# Patient Record
Sex: MALE | Race: OTHER | Employment: FULL TIME | URBAN - METROPOLITAN AREA
[De-identification: names, ages, dates, MRNs, and addresses within clinical notes are randomized per-mention and may not be internally consistent; named-entity substitution may affect disease eponyms.]

---

## 2021-08-29 ENCOUNTER — HOSPITAL ENCOUNTER (EMERGENCY)
Age: 36
Discharge: HOME | End: 2021-08-29

## 2021-08-29 ENCOUNTER — APPOINTMENT (OUTPATIENT)
Dept: CT IMAGING | Age: 36
End: 2021-08-29

## 2021-08-29 VITALS
OXYGEN SATURATION: 98 % | WEIGHT: 191 LBS | HEART RATE: 79 BPM | DIASTOLIC BLOOD PRESSURE: 89 MMHG | TEMPERATURE: 98.4 F | SYSTOLIC BLOOD PRESSURE: 133 MMHG | RESPIRATION RATE: 17 BRPM

## 2021-08-29 DIAGNOSIS — R51.9 NONINTRACTABLE HEADACHE, UNSPECIFIED CHRONICITY PATTERN, UNSPECIFIED HEADACHE TYPE: Primary | ICD-10-CM

## 2021-08-29 PROCEDURE — 99282 EMERGENCY DEPT VISIT SF MDM: CPT

## 2021-08-29 PROCEDURE — 70450 CT HEAD/BRAIN W/O DYE: CPT

## 2021-08-29 RX ORDER — BUTALBITAL, ACETAMINOPHEN AND CAFFEINE 300; 40; 50 MG/1; MG/1; MG/1
1 CAPSULE ORAL EVERY 4 HOURS PRN
Qty: 15 CAPSULE | Refills: 0 | Status: SHIPPED | OUTPATIENT
Start: 2021-08-29 | End: 2021-10-26 | Stop reason: ALTCHOICE

## 2021-08-29 RX ORDER — BUTALBITAL, ACETAMINOPHEN AND CAFFEINE 50; 325; 40 MG/1; MG/1; MG/1
1 TABLET ORAL ONCE
Status: DISCONTINUED | OUTPATIENT
Start: 2021-08-29 | End: 2021-08-30 | Stop reason: HOSPADM

## 2021-08-29 ASSESSMENT — ENCOUNTER SYMPTOMS
DIARRHEA: 0
NAUSEA: 0
VOMITING: 0
CHEST TIGHTNESS: 0
SHORTNESS OF BREATH: 0
ABDOMINAL PAIN: 0

## 2021-08-29 ASSESSMENT — PAIN DESCRIPTION - PAIN TYPE: TYPE: ACUTE PAIN

## 2021-08-29 ASSESSMENT — PAIN SCALES - GENERAL: PAINLEVEL_OUTOF10: 7

## 2021-08-30 ENCOUNTER — CARE COORDINATION (OUTPATIENT)
Dept: CARE COORDINATION | Age: 36
End: 2021-08-30

## 2021-08-30 NOTE — CARE COORDINATION
Patient contacted regarding recent visit for viral symptoms. Call within 2 business days of discharge: Yes     contacted the patient by telephone to perform follow-up call. Contacted pt using [employer].  left HIPAA compliant message on machine requesting a return call. If no return call, will attempt outreach again.
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
## 2021-08-31 NOTE — CARE COORDINATION
Contacted pt using [employer].  ID: [id number] used to interpret call. Pt reports that he is feeling better and has picked up RX and has been taking it. Pt reports that new medication is helping to improve HA. Pt was agreeable for call back next week for symptom recheck. Pt thanked author for calling to follow up. Patient contacted regarding recent visit for viral symptoms. Call within 2 business days of discharge: Yes     contacted the patient by telephone to perform follow-up call. Verified name and  with patient as identifiers. Provided introduction to self, and reason for call due to viral symptoms of infection and/or exposure to COVID-19. Discussed COVID-19 related testing which was not done at this time. Test results were not done. Patient informed of results, if available? No.      Patient presented to emergency department/flu clinic with complaints of viral symptoms/exposure to COVID. Patient reports symptoms are improving. Due to no new or worsening symptoms the RN CTN/ACM was not notified for escalation. This author reviewed discharge instructions, medical action plan and red flags such as increased shortness of breath, increasing fever, worsening cough or chest pain with patient who verbalized understanding. Discussed exposure protocols and quarantine with CDC Guidelines What To Do If You Are Sick    Patient was given an opportunity for questions and concerns. The patient agrees to contact their healthcare provider for questions related to their healthcare. Author provided contact information for future reference.

## 2021-09-08 ENCOUNTER — CARE COORDINATION (OUTPATIENT)
Dept: CARE COORDINATION | Age: 36
End: 2021-09-08

## 2021-09-08 NOTE — CARE COORDINATION
Patient contacted regarding COVID-19 risk and screening. Contacted pt using [employer].  [id number] used to interpret call.  contacted the patient by telephone to perform follow-up call. Verified name and  with patient as identifiers. Symptoms reviewed with patient. Patient reports symptoms are improving. Due to no new or worsening symptoms the RN CTN/LIBBY was not notified for escalation. This author reviewed discharge instructions, medical action plan and red flags such as increased shortness of breath, increasing fever, worsening cough or chest pain with patient who verbalized understanding. Discussed exposure protocols and quarantine with CDC Guidelines What To Do If You Are Sick    Patient who was given an opportunity for questions and concerns. The patient agrees to contact their healthcare provider for questions related to their healthcare. Author provided contact information for future reference. Spoke to pt who reports that he's doing well and is out shopping currently. Pt reports that he is still using the Fioricet prn when he gets a HA. Pt thanked author for calling to follow up.

## 2021-10-02 ENCOUNTER — HOSPITAL ENCOUNTER (EMERGENCY)
Age: 36
Discharge: HOME | End: 2021-10-03
Attending: EMERGENCY MEDICINE

## 2021-10-02 ENCOUNTER — APPOINTMENT (OUTPATIENT)
Dept: GENERAL RADIOLOGY | Age: 36
End: 2021-10-02

## 2021-10-02 DIAGNOSIS — R03.0 ELEVATED BLOOD PRESSURE READING: Primary | ICD-10-CM

## 2021-10-02 DIAGNOSIS — N50.812 PAIN IN LEFT TESTICLE: ICD-10-CM

## 2021-10-02 DIAGNOSIS — R93.89 ABNORMAL CXR: ICD-10-CM

## 2021-10-02 DIAGNOSIS — R73.09 ELEVATED GLUCOSE: ICD-10-CM

## 2021-10-02 DIAGNOSIS — I49.3 PVC (PREMATURE VENTRICULAR CONTRACTION): ICD-10-CM

## 2021-10-02 LAB
ANION GAP SERPL CALCULATED.3IONS-SCNC: 11 MMOL/L (ref 3–16)
BASOPHILS ABSOLUTE: 0.1 K/UL (ref 0–0.2)
BASOPHILS RELATIVE PERCENT: 0.9 %
BUN BLDV-MCNC: 20 MG/DL (ref 7–20)
CALCIUM SERPL-MCNC: 9.2 MG/DL (ref 8.3–10.6)
CHLORIDE BLD-SCNC: 105 MMOL/L (ref 99–110)
CO2: 23 MMOL/L (ref 21–32)
CREAT SERPL-MCNC: 0.9 MG/DL (ref 0.9–1.3)
D DIMER: <200 NG/ML DDU (ref 0–229)
EOSINOPHILS ABSOLUTE: 0.1 K/UL (ref 0–0.6)
EOSINOPHILS RELATIVE PERCENT: 1.4 %
GFR AFRICAN AMERICAN: >60
GFR NON-AFRICAN AMERICAN: >60
GLUCOSE BLD-MCNC: 170 MG/DL (ref 70–99)
HCT VFR BLD CALC: 46.1 % (ref 40.5–52.5)
HEMOGLOBIN: 16.3 G/DL (ref 13.5–17.5)
LYMPHOCYTES ABSOLUTE: 3.5 K/UL (ref 1–5.1)
LYMPHOCYTES RELATIVE PERCENT: 33.9 %
MCH RBC QN AUTO: 31 PG (ref 26–34)
MCHC RBC AUTO-ENTMCNC: 35.3 G/DL (ref 31–36)
MCV RBC AUTO: 87.7 FL (ref 80–100)
MONOCYTES ABSOLUTE: 0.8 K/UL (ref 0–1.3)
MONOCYTES RELATIVE PERCENT: 7.8 %
NEUTROPHILS ABSOLUTE: 5.7 K/UL (ref 1.7–7.7)
NEUTROPHILS RELATIVE PERCENT: 56 %
PDW BLD-RTO: 13.1 % (ref 12.4–15.4)
PLATELET # BLD: 244 K/UL (ref 135–450)
PMV BLD AUTO: 9.8 FL (ref 5–10.5)
POTASSIUM SERPL-SCNC: 3.6 MMOL/L (ref 3.5–5.1)
RBC # BLD: 5.25 M/UL (ref 4.2–5.9)
SODIUM BLD-SCNC: 139 MMOL/L (ref 136–145)
TROPONIN: <0.01 NG/ML
WBC # BLD: 10.2 K/UL (ref 4–11)

## 2021-10-02 PROCEDURE — 80048 BASIC METABOLIC PNL TOTAL CA: CPT

## 2021-10-02 PROCEDURE — 6370000000 HC RX 637 (ALT 250 FOR IP): Performed by: EMERGENCY MEDICINE

## 2021-10-02 PROCEDURE — U0005 INFEC AGEN DETEC AMPLI PROBE: HCPCS

## 2021-10-02 PROCEDURE — 93005 ELECTROCARDIOGRAM TRACING: CPT | Performed by: EMERGENCY MEDICINE

## 2021-10-02 PROCEDURE — 71046 X-RAY EXAM CHEST 2 VIEWS: CPT

## 2021-10-02 PROCEDURE — 85025 COMPLETE CBC W/AUTO DIFF WBC: CPT

## 2021-10-02 PROCEDURE — 85379 FIBRIN DEGRADATION QUANT: CPT

## 2021-10-02 PROCEDURE — 84484 ASSAY OF TROPONIN QUANT: CPT

## 2021-10-02 PROCEDURE — 36415 COLL VENOUS BLD VENIPUNCTURE: CPT

## 2021-10-02 PROCEDURE — 99284 EMERGENCY DEPT VISIT MOD MDM: CPT

## 2021-10-02 PROCEDURE — U0003 INFECTIOUS AGENT DETECTION BY NUCLEIC ACID (DNA OR RNA); SEVERE ACUTE RESPIRATORY SYNDROME CORONAVIRUS 2 (SARS-COV-2) (CORONAVIRUS DISEASE [COVID-19]), AMPLIFIED PROBE TECHNIQUE, MAKING USE OF HIGH THROUGHPUT TECHNOLOGIES AS DESCRIBED BY CMS-2020-01-R: HCPCS

## 2021-10-02 RX ORDER — LIDOCAINE HYDROCHLORIDE 10 MG/ML
INJECTION, SOLUTION EPIDURAL; INFILTRATION; INTRACAUDAL; PERINEURAL
Status: COMPLETED
Start: 2021-10-02 | End: 2021-10-03

## 2021-10-02 RX ORDER — CEFTRIAXONE 1 G/1
500 INJECTION, POWDER, FOR SOLUTION INTRAMUSCULAR; INTRAVENOUS ONCE
Status: COMPLETED | OUTPATIENT
Start: 2021-10-02 | End: 2021-10-03

## 2021-10-02 RX ORDER — DOXYCYCLINE HYCLATE 100 MG
100 TABLET ORAL 2 TIMES DAILY
Qty: 14 TABLET | Refills: 0 | Status: SHIPPED | OUTPATIENT
Start: 2021-10-02 | End: 2021-10-09

## 2021-10-02 RX ORDER — DOXYCYCLINE HYCLATE 100 MG
100 TABLET ORAL ONCE
Status: COMPLETED | OUTPATIENT
Start: 2021-10-02 | End: 2021-10-03

## 2021-10-02 RX ORDER — ACETAMINOPHEN 500 MG
1000 TABLET ORAL ONCE
Status: COMPLETED | OUTPATIENT
Start: 2021-10-02 | End: 2021-10-02

## 2021-10-02 RX ADMIN — ACETAMINOPHEN 1000 MG: 500 TABLET ORAL at 21:05

## 2021-10-02 ASSESSMENT — PAIN SCALES - GENERAL: PAINLEVEL_OUTOF10: 7

## 2021-10-03 VITALS
TEMPERATURE: 97.9 F | DIASTOLIC BLOOD PRESSURE: 78 MMHG | RESPIRATION RATE: 18 BRPM | SYSTOLIC BLOOD PRESSURE: 138 MMHG | HEART RATE: 99 BPM | OXYGEN SATURATION: 100 %

## 2021-10-03 LAB
EKG ATRIAL RATE: 99 BPM
EKG DIAGNOSIS: NORMAL
EKG P AXIS: 53 DEGREES
EKG P-R INTERVAL: 132 MS
EKG Q-T INTERVAL: 350 MS
EKG QRS DURATION: 90 MS
EKG QTC CALCULATION (BAZETT): 449 MS
EKG R AXIS: 57 DEGREES
EKG T AXIS: 37 DEGREES
EKG VENTRICULAR RATE: 99 BPM
SARS-COV-2: NOT DETECTED

## 2021-10-03 PROCEDURE — 96372 THER/PROPH/DIAG INJ SC/IM: CPT

## 2021-10-03 PROCEDURE — 93010 ELECTROCARDIOGRAM REPORT: CPT | Performed by: INTERNAL MEDICINE

## 2021-10-03 PROCEDURE — 6370000000 HC RX 637 (ALT 250 FOR IP): Performed by: EMERGENCY MEDICINE

## 2021-10-03 PROCEDURE — 2500000003 HC RX 250 WO HCPCS

## 2021-10-03 PROCEDURE — 6360000002 HC RX W HCPCS: Performed by: EMERGENCY MEDICINE

## 2021-10-03 RX ADMIN — DOXYCYCLINE HYCLATE 100 MG: 100 TABLET, COATED ORAL at 00:03

## 2021-10-03 RX ADMIN — LIDOCAINE HYDROCHLORIDE 10 ML: 10 INJECTION, SOLUTION EPIDURAL; INFILTRATION; INTRACAUDAL; PERINEURAL at 00:04

## 2021-10-03 RX ADMIN — CEFTRIAXONE SODIUM 500 MG: 1 INJECTION, POWDER, FOR SOLUTION INTRAMUSCULAR; INTRAVENOUS at 00:03

## 2021-10-03 NOTE — ED PROVIDER NOTES
[facility] Emergency Department      Pt Name: Debbie Curry  MRN: [id number]  [place] 1985  Date of evaluation: 10/2/2021  Provider: Gonsalo Arroyo MD  CHIEF COMPLAINT  Chief Complaint   Patient presents with    Hypertension     c/o increased bp, upper back pain, and sob for a month off and on. no pcp. not on any medications for bp. HPI  Debbie Curry is a 29 y.o. male who presents because of difficulty breathing and a weird feeling in his upper back and chest area. He says it is not painful. He has been noticing it for the past month and a half off and on. He is concerned about his blood pressure being elevated. He has not on any blood pressure medications and does not have a primary care doctor. He denies any cough or congestion. He denies any infectious symptoms. He has not had any high fevers at home that he is aware of. Denies any known exposure to illness. He occasionally gets headaches. REVIEW OF SYSTEMS:  No fever, no dysuria, no vomiting, no diarrhea, he denied pain else where or concerns for infection during initial interview with video  Pertinent positives and negatives as per the HPI. All other review of systems reviewed and negative. Nursing notes reviewed. PAST MEDICAL HISTORY  History reviewed. No pertinent past medical history. SURGICAL HISTORY  History reviewed. No pertinent surgical history. MEDICATIONS:  No current facility-administered medications on file prior to encounter. Current Outpatient Medications on File Prior to Encounter   Medication Sig Dispense Refill    NONFORMULARY Nerve medication      butalbital-APAP-caffeine (FIORICET) -40 MG CAPS per capsule Take 1 capsule by mouth every 4 hours as needed for Headaches 15 capsule 0     ALLERGIES  Patient has no known allergies. FAMILY HISTORY:  History reviewed. No pertinent family history.   SOCIAL HISTORY:  Social History     Tobacco Use    Smoking status: Never Smoker    Smokeless tobacco: Never Used   Substance Use Topics    Alcohol use: Never    Drug use: Not Currently     IMMUNIZATIONS:      There is no immunization history on file for this patient. PHYSICAL EXAM  VITAL SIGNS:  BP (!) 142/95   Pulse 95   Temp 99 °F (37.2 °C) (Oral)   Resp 17   SpO2 99%   Constitutional:  29 y.o. male alert, nontoxic  HENT:  Atraumatic, mucous membranes moist  Eyes:   Conjunctiva clear, no discharge, no icterus  Neck:  Supple, no adenopathy, no visible JVD, no stridor  Cardiovascular:  Regular, no rubs  Thorax & Lungs:  No accessory muscle usage, clear  Abdomen:  Soft, non distended, bowel sounds present, nontender  Back:  No deformity  Genitalia:  Normal appearing scrotum, no inguinal masses or hernias, testicles smooth, non tender, no swelling, cremasteric reflex normal   Rectal:  Deferred  Extremities:  No cyanosis, no tenderness, edema negative  Skin:  Warm, dry  Neurologic:  Alert, mentation normal  Psychiatric:  Affect appropriate    DIAGNOSTIC RESULTS:  Labs resulted at the time of this note reviewed.   Labs Reviewed   BASIC METABOLIC PANEL - Abnormal; Notable for the following components:       Result Value    Glucose 170 (*)     All other components within normal limits    Narrative:     Performed at:  [facility] [address], [place], [address]   Phone [phone]   CBC WITH AUTO DIFFERENTIAL    Narrative:     Performed at:  [facility] [address], [place], [facility] [place]   Phone [phone]   TROPONIN    Narrative:     Performed at:  [facility] [address], [place], [facility] [place]   Phone [phone]   D-DIMER, QUANTITATIVE    Narrative:     Performed at:  [facility] [address], [place], [address]   Phone [phone]     EKG:  Read by me in the absence of a cardiologist shows: sinus rhythm, rate 99, PVCs, QRS duration normal, axis 57 degrees, no acute injury pattern, no prior study available for comparison    RADIOLOGY:    Plain x-rays were viewed by me:   XR CHEST (2 VW)   Final Result   Questionable mild atelectasis or less likely early infiltrates scattered   along the lung bases. Recommend short-term follow-up. ED COURSE:    Medications administered:  Medications   acetaminophen (TYLENOL) tablet 1,000 mg (1,000 mg Oral Given 10/2/21 2105)   cefTRIAXone (ROCEPHIN) injection 500 mg (500 mg IntraMUSCular Given 10/3/21 0003)   doxycycline hyclate (VIBRA-TABS) tablet 100 mg (100 mg Oral Given 10/3/21 0003)   lidocaine PF 1 % injection (10 mLs  Given 10/3/21 0004)     Vitals:    10/02/21 2031 10/03/21 0008   BP: (!) 142/95 138/78   Pulse: 95 99   Resp: 17 18   Temp: 99 °F (37.2 °C) 97.9 °F (36.6 °C)   TempSrc: Oral    SpO2: 99% 100%     PROCEDURES:  None    CRITICAL CARE:  None    CONSULTATIONS:  None    MEDICAL DECISION MAKING: Prabha Wakefield is a 29 y.o. male who presented because of concern for high blood pressure and unusual feeling in his chest (no pain). His diagnostic work-up shows elevated blood sugar and abnormality on chest x-ray. This possibility of diabetes. I have added of Covid test to rule out covid source for abnormal CXR. I will cover for the possibility of atypical pneumonia since he has been having symptoms for about a month. At the end of his visit, he added on that his left testicle has had discomfort off and on for the past week. He does not have any clinical findings of torsion at this point in time and does not have any pain at present. This is something that he left out of the initial interview. I will refer him to urology but will also provide antibiotics to cover for pulmonary source for infection as well as the possibility of epididymitis. Cardiology referral due to PVCs noted on EKG. He had infrequent PVCs while monitored in the room.     The patient's history and evaluation suggests a less emergent etiology for the discomfort. I do not believe the patient is experiencing symptoms from acute coronary syndrome, aortic dissection, pulmonary embolism, pneumothorax, myocarditis, Boerhaave syndrome, pericardial tamponade, acute abdomen, amongst other emergencies. Daksha Santiago was given appropriate discharge instructions. Referral to follow up provider. Discharge Medication List as of 10/2/2021 11:59 PM      START taking these medications    Details   doxycycline hyclate (VIBRA-TABS) 100 MG tablet Take 1 tablet by mouth 2 times daily for 7 days, Disp-14 tablet, R-0Print           FOLLOW UP:    [facility]  [phone]  Schedule an appointment as soon as possible for a visit       Manny Apgar, MD  [address] [place] [address]. [address] [place] [address] [address]    Schedule an appointment as soon as possible for a visit       William Washington MD  [facility]  [phone]    Schedule an appointment as soon as possible for a visit       [facility] Emergency Department  [address]  Go to   If symptoms worsen    FINAL IMPRESSION:    1. Elevated blood pressure reading    2. PVC (premature ventricular contraction)    3. Abnormal CXR    4. Elevated glucose    5. Pain in left testicle      (Please note that I used voice recognition software to generate this note.   Occasionally words are mistranscribed despite my efforts to edit errors.)       Zafar Lepe MD  10/03/21 7071

## 2021-10-12 ENCOUNTER — APPOINTMENT (OUTPATIENT)
Dept: GENERAL RADIOLOGY | Age: 36
End: 2021-10-12

## 2021-10-12 ENCOUNTER — HOSPITAL ENCOUNTER (EMERGENCY)
Age: 36
Discharge: HOME | End: 2021-10-12

## 2021-10-12 VITALS
SYSTOLIC BLOOD PRESSURE: 140 MMHG | DIASTOLIC BLOOD PRESSURE: 68 MMHG | BODY MASS INDEX: 29.35 KG/M2 | RESPIRATION RATE: 16 BRPM | HEART RATE: 95 BPM | TEMPERATURE: 98.3 F | OXYGEN SATURATION: 100 % | HEIGHT: 67 IN | WEIGHT: 187 LBS

## 2021-10-12 DIAGNOSIS — Z78.9 NON-ENGLISH SPEAKING PATIENT: ICD-10-CM

## 2021-10-12 DIAGNOSIS — R07.9 CHEST PAIN, UNSPECIFIED TYPE: Primary | ICD-10-CM

## 2021-10-12 DIAGNOSIS — I49.3 PVC (PREMATURE VENTRICULAR CONTRACTION): ICD-10-CM

## 2021-10-12 LAB
A/G RATIO: 1.7 (ref 1.1–2.2)
ALBUMIN SERPL-MCNC: 4.3 G/DL (ref 3.4–5)
ALP BLD-CCNC: 90 U/L (ref 40–129)
ALT SERPL-CCNC: 66 U/L (ref 10–40)
ANION GAP SERPL CALCULATED.3IONS-SCNC: 11 MMOL/L (ref 3–16)
AST SERPL-CCNC: 32 U/L (ref 15–37)
BASOPHILS ABSOLUTE: 0.1 K/UL (ref 0–0.2)
BASOPHILS RELATIVE PERCENT: 0.8 %
BILIRUB SERPL-MCNC: 0.3 MG/DL (ref 0–1)
BUN BLDV-MCNC: 15 MG/DL (ref 7–20)
CALCIUM SERPL-MCNC: 9.7 MG/DL (ref 8.3–10.6)
CHLORIDE BLD-SCNC: 104 MMOL/L (ref 99–110)
CO2: 23 MMOL/L (ref 21–32)
CREAT SERPL-MCNC: 0.7 MG/DL (ref 0.9–1.3)
EOSINOPHILS ABSOLUTE: 0.1 K/UL (ref 0–0.6)
EOSINOPHILS RELATIVE PERCENT: 0.6 %
GFR AFRICAN AMERICAN: >60
GFR NON-AFRICAN AMERICAN: >60
GLOBULIN: 2.5 G/DL
GLUCOSE BLD-MCNC: 104 MG/DL (ref 70–99)
HCT VFR BLD CALC: 47.1 % (ref 40.5–52.5)
HEMOGLOBIN: 16.2 G/DL (ref 13.5–17.5)
LYMPHOCYTES ABSOLUTE: 2.2 K/UL (ref 1–5.1)
LYMPHOCYTES RELATIVE PERCENT: 25 %
MCH RBC QN AUTO: 30.8 PG (ref 26–34)
MCHC RBC AUTO-ENTMCNC: 34.4 G/DL (ref 31–36)
MCV RBC AUTO: 89.5 FL (ref 80–100)
MONOCYTES ABSOLUTE: 0.6 K/UL (ref 0–1.3)
MONOCYTES RELATIVE PERCENT: 6.6 %
NEUTROPHILS ABSOLUTE: 6 K/UL (ref 1.7–7.7)
NEUTROPHILS RELATIVE PERCENT: 67 %
PDW BLD-RTO: 13.1 % (ref 12.4–15.4)
PLATELET # BLD: 247 K/UL (ref 135–450)
PMV BLD AUTO: 9.3 FL (ref 5–10.5)
POTASSIUM REFLEX MAGNESIUM: 4.3 MMOL/L (ref 3.5–5.1)
RBC # BLD: 5.26 M/UL (ref 4.2–5.9)
SODIUM BLD-SCNC: 138 MMOL/L (ref 136–145)
TOTAL PROTEIN: 6.8 G/DL (ref 6.4–8.2)
TROPONIN: <0.01 NG/ML
WBC # BLD: 8.9 K/UL (ref 4–11)

## 2021-10-12 PROCEDURE — 36415 COLL VENOUS BLD VENIPUNCTURE: CPT

## 2021-10-12 PROCEDURE — 84484 ASSAY OF TROPONIN QUANT: CPT

## 2021-10-12 PROCEDURE — 99283 EMERGENCY DEPT VISIT LOW MDM: CPT

## 2021-10-12 PROCEDURE — 6370000000 HC RX 637 (ALT 250 FOR IP): Performed by: NURSE PRACTITIONER

## 2021-10-12 PROCEDURE — 85025 COMPLETE CBC W/AUTO DIFF WBC: CPT

## 2021-10-12 PROCEDURE — 80053 COMPREHEN METABOLIC PANEL: CPT

## 2021-10-12 PROCEDURE — 93005 ELECTROCARDIOGRAM TRACING: CPT | Performed by: EMERGENCY MEDICINE

## 2021-10-12 PROCEDURE — 71045 X-RAY EXAM CHEST 1 VIEW: CPT

## 2021-10-12 RX ORDER — IBUPROFEN 600 MG/1
600 TABLET ORAL ONCE
Status: COMPLETED | OUTPATIENT
Start: 2021-10-12 | End: 2021-10-12

## 2021-10-12 RX ADMIN — IBUPROFEN 600 MG: 600 TABLET ORAL at 22:11

## 2021-10-12 ASSESSMENT — PAIN SCALES - GENERAL
PAINLEVEL_OUTOF10: 7
PAINLEVEL_OUTOF10: 7

## 2021-10-13 LAB
EKG ATRIAL RATE: 81 BPM
EKG DIAGNOSIS: NORMAL
EKG P AXIS: 47 DEGREES
EKG P-R INTERVAL: 138 MS
EKG Q-T INTERVAL: 380 MS
EKG QRS DURATION: 92 MS
EKG QTC CALCULATION (BAZETT): 441 MS
EKG R AXIS: 49 DEGREES
EKG T AXIS: 34 DEGREES
EKG VENTRICULAR RATE: 81 BPM

## 2021-10-13 PROCEDURE — 93010 ELECTROCARDIOGRAM REPORT: CPT | Performed by: INTERNAL MEDICINE

## 2021-10-13 ASSESSMENT — HEART SCORE: ECG: 0

## 2021-10-13 NOTE — ED PROVIDER NOTES
[facility]        Pt Name: Janny Agustin  MRN: [id number]  [place] 1985  Date of evaluation: 10/12/2021  Provider: MAMIE Rabago CNP  PCP: No primary care provider on file. Note Started: 9:31 PM EDT       CHELSEY. I have evaluated this patient. My supervising physician was available for consultation. CHIEF COMPLAINT       Chief Complaint   Patient presents with    Chest Pain     pt states that this afternoon he started having chest pain and pain under his left arm, states feels like stabbing pain that radiates down back neck. states he had pneumonia 2 weeks ago and was given medication but didn't know if he still has it or not. HISTORY OF PRESENT ILLNESS   (Location, Timing/Onset, Context/Setting, Quality, Duration, Modifying Factors, Severity, Associated Signs and Symptoms)  Note limiting factors. Chief Complaint: chest pain     Janny Agustin is a 29 y.o. male with c/o left sided chest pain for 2 weeks. Pt was seen here 2 weeks ago and was told maybe has pneumonia and is requesting a recheck. Symptoms continued since his visit 10/2/2021, although seems much improved since 10/2 visit. Rina Billy He completed course of doxycycline. He denies f/u with PCP for a recheck or to monitor his HTN, no taking any medicine. He has any appointment with his PCP 10/26/2021. He denies any associated nausea, vomiting, diarrhea, rhinorrhea, sneezing, headache, neck pain, back pain, leg swelling, lightheaded, dizzy, syncope, palpitations. denies smoking, etoh, or drug use. He denies any known thyroid disease. Denies any increase in stimulants or caffeine intake. Denies any prior cardiac work-up to include a stress test, echo, or cardiac cath. Denies any family history of any CAD under the age of 48. He was taking ASA 81mg, however hasn't taken it in 2 days. Denies any COVID-19 vaccine.  Denies any fevers or known exposure to anyone who has tested positive for COVID-19. Currently chest pain is 3/10. Pt is nonenglish speaking and  #[id number]    Nursing Notes were all reviewed and agreed with or any disagreements were addressed in the HPI. REVIEW OF SYSTEMS    (2-9 systems for level 4, 10 or more for level 5)     Review of Systems    Positives and Pertinent negatives as per HPI. Except as noted above in the ROS, all other systems were reviewed and negative. PAST MEDICAL HISTORY     Past Medical History:   Diagnosis Date    Hypertension          SURGICAL HISTORY   History reviewed. No pertinent surgical history. CURRENTMEDICATIONS       Previous Medications    BUTALBITAL-APAP-CAFFEINE (FIORICET) -40 MG CAPS PER CAPSULE    Take 1 capsule by mouth every 4 hours as needed for Headaches    NONFORMULARY    Nerve medication         ALLERGIES     Patient has no known allergies. FAMILYHISTORY     History reviewed. No pertinent family history. SOCIAL HISTORY       Social History     Tobacco Use    Smoking status: Never Smoker    Smokeless tobacco: Never Used   Substance Use Topics    Alcohol use: Never    Drug use: Not Currently       SCREENINGS             PHYSICAL EXAM    (up to 7 for level 4, 8 or more for level 5)     ED Triage Vitals   BP Temp Temp Source Pulse Resp SpO2 Height Weight   10/12/21 2115 10/12/21 2115 10/12/21 2115 10/12/21 2115 10/12/21 2115 10/12/21 2115 10/12/21 2120 10/12/21 2117   (!) 140/68 98.3 °F (36.8 °C) Oral 95 16 100 % 5' 7\" (1.702 m) 187 lb (84.8 kg)       Physical Exam  Vitals and nursing note reviewed. Constitutional:       General: He is awake. Appearance: Normal appearance. He is well-developed and overweight. HENT:      Head: Normocephalic and atraumatic. Nose: Nose normal.   Eyes:      General:         Right eye: No discharge. Left eye: No discharge. Cardiovascular:      Rate and Rhythm: Normal rate. Rhythm irregular.       Heart sounds: Normal heart sounds. Pulmonary:      Effort: Pulmonary effort is normal. No respiratory distress. Breath sounds: Normal breath sounds. Musculoskeletal:         General: Normal range of motion. Cervical back: Normal range of motion. Right lower leg: No edema. Left lower leg: No edema. Skin:     General: Skin is warm and dry. Coloration: Skin is not pale. Neurological:      Mental Status: He is alert and oriented to person, place, and time. Psychiatric:         Behavior: Behavior normal. Behavior is cooperative. DIAGNOSTIC RESULTS   LABS:    Labs Reviewed   COMPREHENSIVE METABOLIC PANEL W/ REFLEX TO MG FOR LOW K - Abnormal; Notable for the following components:       Result Value    Glucose 104 (*)     CREATININE 0.7 (*)     ALT 66 (*)     All other components within normal limits    Narrative:     Performed at:  [facility] [address]   Phone [phone]   CBC WITH AUTO DIFFERENTIAL    Narrative:     Performed at:  [facility] [address] [facility]   Phone [phone]   TROPONIN    Narrative:     Performed at:  [facility] [address] [facility]   Phone [phone]       When ordered only abnormal lab results are displayed. All other labs were within normal range or not returned as of this dictation. EKG: When ordered, EKG's are interpreted by the Emergency Department Physician in the absence of a cardiologist.  Please see their note for interpretation of EKG. RADIOLOGY:   Non-plain film images such as CT, Ultrasound and MRI are read by the radiologist. Plain radiographic images are visualized and preliminarily interpreted by the ED Provider with the below findings:        Interpretation per the Radiologist below, if available at the time of this note:    XR CHEST PORTABLE   Preliminary Result   1. Prominence of the pulmonary vasculature in the setting of low lung volumes. No results found. PROCEDURES   Unless otherwise noted below, none     Procedures    CRITICAL CARE TIME   N/A    CONSULTS:  None      EMERGENCY DEPARTMENT COURSE and DIFFERENTIAL DIAGNOSIS/MDM:   Vitals:    Vitals:    10/12/21 2115 10/12/21 2117 10/12/21 2120   BP: (!) 140/68     Pulse: 95     Resp: 16     Temp: 98.3 °F (36.8 °C)     TempSrc: Oral     SpO2: 100%     Weight:  187 lb (84.8 kg)    Height:   5' 7\" (1.702 m)       Patient was given the following medications:  Medications   ibuprofen (ADVIL;MOTRIN) tablet 600 mg (600 mg Oral Given 10/12/21 2211)           Care of this patient took place during the COVID-19 pandemic emergency. ED COURSE & MEDICAL DECISION MAKING    - The patient presented to the ER with complaints of chest pain. Vital signs were reviewed. Exam well-developed, well-nourished male who appears uncomfortable. Peripheral IV placed. Labs, Imaging ordered. - Pertinent Labs & Imaging studies reviewed. (See chart for details)   -  Patient seen and evaluated in the emergency department. -  Triage and nursing notes reviewed and incorporated. -  Old chart records reviewed and incorporated. -  CHELSEY. I have evaluated this patient. My supervising physician was available for consultation.  -  Differential diagnosis includes: ACS, MI, costochondritis, pleurisy, aneurysm, dissection, bronchitis, pneumonia, pleural effusion, CHF, cardiomegaly, esophageal rupture, endocarditis, pericarditis, PE, pneumothorax, tamponade versus COVID-19  -  Work-up included:  See above  -  ED treatment included:   Motrin  -  Results discussed with patient. Margaret Santos is a 78-year-old  male with complaints of continued left-sided chest pain with cough since he was assessed in the emergency department 10/2/2021.   Is complete his course of doxycycline, however continues to have the symptoms and presented for recheck. His exam is unremarkable. Lab work and imaging was obtained. CBC is unremarkable. CMP with glucose 104, creatinine 0.7, ALT 66. Troponin negative. Chest x-ray shows prominence of the pulmonary vasculature in the setting of low lung volumes. Patient was informed on these results. Encouraged to follow-up with cardiology as he said he has an appointment scheduled for 10/26/2021. He also notes an appointment scheduled for his PCP on 10/14/2021. Heart score 1. Is encouraged to keep these appointments. He was given strict return discharge instructions. Shared decision making is completed and patient is stable for discharge at this time.  #[id number] was used for results and discharge instructions. FINAL IMPRESSION      1. Chest pain, unspecified type    2. PVC (premature ventricular contraction)    3.  Non-English speaking patient          DISPOSITION/PLAN   DISPOSITION        PATIENT REFERRED TO:  [facility] Emergency Department  [address], [place]  [address] [facility] [address]  [phone]  Go to   As needed    [facility]  [phone]  Call in 2 days  As needed, If symptoms worsen    Sagrario Inman DO  [address]  [phone]    Call in 2 days  additional outpatient testing      DISCHARGE MEDICATIONS:  New Prescriptions    No medications on file       DISCONTINUED MEDICATIONS:  Discontinued Medications    No medications on file              (Please note that portions of this note were completed with a voice recognition program.  Efforts were made to edit the dictations but occasionally words are mis-transcribed.)    MAMIE Leonard CNP (electronically signed)            MAMIE Leonard CNP  10/13/21 0006

## 2021-10-13 NOTE — ED PROVIDER NOTES
EKG: Sinus rhythm, occasional PVCs normal axis normal R wave progression mild LVH, no acute ST segment abnormalities      Radu Altman MD  85/11/26 2272

## 2021-10-26 ENCOUNTER — OFFICE VISIT (OUTPATIENT)
Dept: INTERNAL MEDICINE CLINIC | Age: 36
End: 2021-10-26

## 2021-10-26 VITALS
WEIGHT: 188.8 LBS | SYSTOLIC BLOOD PRESSURE: 120 MMHG | HEIGHT: 67 IN | DIASTOLIC BLOOD PRESSURE: 80 MMHG | BODY MASS INDEX: 29.63 KG/M2 | HEART RATE: 68 BPM

## 2021-10-26 DIAGNOSIS — F41.9 ANXIETY: ICD-10-CM

## 2021-10-26 DIAGNOSIS — R07.9 CHEST PAIN, UNSPECIFIED TYPE: ICD-10-CM

## 2021-10-26 DIAGNOSIS — R73.9 HYPERGLYCEMIA: ICD-10-CM

## 2021-10-26 DIAGNOSIS — K75.9 HEPATITIS: Primary | ICD-10-CM

## 2021-10-26 PROCEDURE — 99204 OFFICE O/P NEW MOD 45 MIN: CPT | Performed by: INTERNAL MEDICINE

## 2021-10-26 RX ORDER — IBUPROFEN 200 MG
200 TABLET ORAL EVERY 6 HOURS PRN
COMMUNITY
End: 2021-11-04 | Stop reason: ALTCHOICE

## 2021-10-26 RX ORDER — BUSPIRONE HYDROCHLORIDE 5 MG/1
5 TABLET ORAL 2 TIMES DAILY
Qty: 60 TABLET | Refills: 0 | Status: SHIPPED | OUTPATIENT
Start: 2021-10-26 | End: 2021-11-09 | Stop reason: SDUPTHER

## 2021-10-26 SDOH — ECONOMIC STABILITY: FOOD INSECURITY: WITHIN THE PAST 12 MONTHS, THE FOOD YOU BOUGHT JUST DIDN'T LAST AND YOU DIDN'T HAVE MONEY TO GET MORE.: NEVER TRUE

## 2021-10-26 SDOH — ECONOMIC STABILITY: FOOD INSECURITY: WITHIN THE PAST 12 MONTHS, YOU WORRIED THAT YOUR FOOD WOULD RUN OUT BEFORE YOU GOT MONEY TO BUY MORE.: NEVER TRUE

## 2021-10-26 ASSESSMENT — SOCIAL DETERMINANTS OF HEALTH (SDOH): HOW HARD IS IT FOR YOU TO PAY FOR THE VERY BASICS LIKE FOOD, HOUSING, MEDICAL CARE, AND HEATING?: NOT HARD AT ALL

## 2021-10-26 ASSESSMENT — ENCOUNTER SYMPTOMS
SHORTNESS OF BREATH: 1
SPUTUM PRODUCTION: 0
ORTHOPNEA: 0
COUGH: 0
ABDOMINAL PAIN: 0

## 2021-10-26 ASSESSMENT — PATIENT HEALTH QUESTIONNAIRE - PHQ9
2. FEELING DOWN, DEPRESSED OR HOPELESS: 0
SUM OF ALL RESPONSES TO PHQ9 QUESTIONS 1 & 2: 0
SUM OF ALL RESPONSES TO PHQ QUESTIONS 1-9: 0
1. LITTLE INTEREST OR PLEASURE IN DOING THINGS: 0

## 2021-10-27 ENCOUNTER — OFFICE VISIT (OUTPATIENT)
Dept: CARDIOLOGY CLINIC | Age: 36
End: 2021-10-27

## 2021-10-27 VITALS
DIASTOLIC BLOOD PRESSURE: 66 MMHG | HEIGHT: 67 IN | BODY MASS INDEX: 29.19 KG/M2 | HEART RATE: 84 BPM | OXYGEN SATURATION: 98 % | SYSTOLIC BLOOD PRESSURE: 128 MMHG | WEIGHT: 186 LBS

## 2021-10-27 DIAGNOSIS — I49.3 PVC'S (PREMATURE VENTRICULAR CONTRACTIONS): ICD-10-CM

## 2021-10-27 DIAGNOSIS — I49.3 VENTRICULAR PREMATURE DEPOLARIZATION: ICD-10-CM

## 2021-10-27 DIAGNOSIS — I10 HYPERTENSION, UNSPECIFIED TYPE: ICD-10-CM

## 2021-10-27 DIAGNOSIS — R07.9 CHEST PAIN, UNSPECIFIED TYPE: Primary | ICD-10-CM

## 2021-10-27 PROCEDURE — 93244 EXT ECG>48HR<7D REV&INTERPJ: CPT | Performed by: INTERNAL MEDICINE

## 2021-10-27 PROCEDURE — 99204 OFFICE O/P NEW MOD 45 MIN: CPT | Performed by: INTERNAL MEDICINE

## 2021-10-27 RX ORDER — ASPIRIN 81 MG/1
81 TABLET ORAL PRN
COMMUNITY
End: 2021-11-04 | Stop reason: ALTCHOICE

## 2021-10-27 NOTE — PATIENT INSTRUCTIONS
Record blood pressures at home (write down date and bp reading)  Sit and relax for at least 5 min before checking blood pressure  Bring blood pressure cuff to next visit to check accuracy   3 day holter heart monitor   Echocardiogram (ultrasound of heart)  Plain treadmill stress test  Follow up with testing

## 2021-10-27 NOTE — PROGRESS NOTES
[facility]  Cardiac Consult     Referring Provider:  No primary care provider on file. Chief Complaint   Patient presents with    New Patient    Hypertension    Shortness of Breath        History of Present Illness:  History obtained with the assistance of interpretor. 29 y.o. male seen as a new patient for palpitations and chest discomfort. He has been feeling bad for about 1 month. He feels his heart \"race and skip\". Also feels that his blood pressure is high at time. Pressure in his head. Some associated chest tightness. He did have a head CT that was negative. The symptoms seem worse when he is not active. At work and with physical activity such as soccer he does not seem to notice the symptoms. He was seen in the ER and was found to have frequent PVC's. Otherwise evaluation negative. He was seen by PCP yesterday and stress myoview ordered. Past Medical History:   has a past medical history of Hyperglycemia and Hypertension. Surgical History:   has no past surgical history on file. Social History:  Social History     Tobacco Use    Smoking status: Never Smoker    Smokeless tobacco: Never Used   Substance Use Topics    Alcohol use: Never        Family History:  Negative for premature CAD    Allergies:  Patient has no known allergies. Home Medications:  Prior to Visit Medications    Medication Sig Taking? Authorizing Provider   aspirin 81 MG EC tablet Take 81 mg by mouth as needed for Pain Yes Historical Provider, MD   ibuprofen (ADVIL;MOTRIN) 200 MG tablet Take 200 mg by mouth every 6 hours as needed for Pain Yes Historical Provider, MD   busPIRone (BUSPAR) 5 MG tablet Take 1 tablet by mouth 2 times daily Yes Triston Stuart MD   NONFORMULARY Nerve medication Yes Historical Provider, MD       [x] Medications and dosages reviewed.     ROS:  [x]Full ROS obtained and negative except as mentioned in HPI      Physical Examination:    Vitals:    10/27/21 0954   BP: 128/66   Site: Right Upper Arm   Position: Sitting   Cuff Size: Large Adult   Pulse: 84   SpO2: 98%   Weight: 186 lb (84.4 kg)   Height: 5' 7\" (1.702 m)        · GENERAL: Well developed, well nourished, No acute distress  · NEUROLOGICAL: Alert and oriented  · PSYCH: Calm affect  · SKIN: Warm and dry, No visible rash,   · EYES: Pupils equal and round, Sclera non-icteric,   · HENT:  External ears and nose unremarkable, mucus membranes moist  · MUSCULOSKELETAL: Normal cephalic, neck supple  · CAROTID: Normal upstroke, no bruits  · CARDIAC: JVP normal, Normal PMI, regular rate and rhythm, normal S1S2, no murmur, rub, or gallop  · RESPIRATORY: Normal respiratory effort, clear to auscultation bilaterally  · EXTREMITIES: No LE edema  · GASTROINTESTINAL: normal bowel sounds, soft, non-tender, No hepatomegaly     CXR 10/2/2021  Questionable mild atelectasis or less likely early infiltrates scattered along the lung bases. Recommend short-term follow-up. 10/12/2021  Prominence of the pulmonary vasculature in the setting of low lung volumes. EKG: Tracings reviewed  10/2-NSR, Frequent PVCs  10/12-NSR, PVC's    Assessment:     Palpitations:  PVC's. Likely RVOT  Plan Holter to quantitate PVC's and GXT for chest pain    Chest tightness:  GXT as above    HTN:  Usually normal here  He says it is high at times at home. He will follow, bring readings and cuff to f/u viit.      Plan:  Follow BP and bring readings  Holter, GXT and ECHO    Thank you for allowing me to participate in the care of this individual.      Zaheer Pereira M.D., [facility]

## 2021-10-29 DIAGNOSIS — R07.9 CHEST PAIN, UNSPECIFIED TYPE: ICD-10-CM

## 2021-10-29 DIAGNOSIS — R73.9 HYPERGLYCEMIA: ICD-10-CM

## 2021-10-29 DIAGNOSIS — K75.9 HEPATITIS: ICD-10-CM

## 2021-10-29 LAB
A/G RATIO: 1.9 (ref 1.1–2.2)
ALBUMIN SERPL-MCNC: 4.7 G/DL (ref 3.4–5)
ALP BLD-CCNC: 86 U/L (ref 40–129)
ALT SERPL-CCNC: 75 U/L (ref 10–40)
ANION GAP SERPL CALCULATED.3IONS-SCNC: 13 MMOL/L (ref 3–16)
AST SERPL-CCNC: 34 U/L (ref 15–37)
BILIRUB SERPL-MCNC: 0.6 MG/DL (ref 0–1)
BUN BLDV-MCNC: 11 MG/DL (ref 7–20)
CALCIUM SERPL-MCNC: 10.2 MG/DL (ref 8.3–10.6)
CHLORIDE BLD-SCNC: 102 MMOL/L (ref 99–110)
CHOLESTEROL, TOTAL: 199 MG/DL (ref 0–199)
CO2: 25 MMOL/L (ref 21–32)
CREAT SERPL-MCNC: 0.7 MG/DL (ref 0.9–1.3)
GFR AFRICAN AMERICAN: >60
GFR NON-AFRICAN AMERICAN: >60
GLUCOSE BLD-MCNC: 90 MG/DL (ref 70–99)
HBV SURFACE AB TITR SER: <3.5 MIU/ML
HDLC SERPL-MCNC: 53 MG/DL (ref 40–60)
HEPATITIS B CORE IGM ANTIBODY: NORMAL
HEPATITIS B SURFACE ANTIGEN INTERPRETATION: NORMAL
HEPATITIS C ANTIBODY INTERPRETATION: NORMAL
LDL CHOLESTEROL CALCULATED: 128 MG/DL
POTASSIUM SERPL-SCNC: 4.5 MMOL/L (ref 3.5–5.1)
SODIUM BLD-SCNC: 140 MMOL/L (ref 136–145)
TOTAL PROTEIN: 7.2 G/DL (ref 6.4–8.2)
TRIGL SERPL-MCNC: 90 MG/DL (ref 0–150)
VLDLC SERPL CALC-MCNC: 18 MG/DL

## 2021-10-30 LAB
ESTIMATED AVERAGE GLUCOSE: 99.7 MG/DL
HBA1C MFR BLD: 5.1 %

## 2021-10-31 LAB — HAV AB SERPL IA-ACNC: POSITIVE

## 2021-11-02 ENCOUNTER — HOSPITAL ENCOUNTER (EMERGENCY)
Age: 36
Discharge: HOME | End: 2021-11-02

## 2021-11-02 VITALS
OXYGEN SATURATION: 98 % | RESPIRATION RATE: 14 BRPM | BODY MASS INDEX: 26.94 KG/M2 | TEMPERATURE: 96.4 F | DIASTOLIC BLOOD PRESSURE: 94 MMHG | HEART RATE: 91 BPM | SYSTOLIC BLOOD PRESSURE: 128 MMHG | WEIGHT: 172 LBS

## 2021-11-02 DIAGNOSIS — K75.9 HEPATITIS: Primary | ICD-10-CM

## 2021-11-02 LAB
A/G RATIO: 1.5 (ref 1.1–2.2)
ALBUMIN SERPL-MCNC: 4.4 G/DL (ref 3.4–5)
ALP BLD-CCNC: 87 U/L (ref 40–129)
ALT SERPL-CCNC: 67 U/L (ref 10–40)
ANION GAP SERPL CALCULATED.3IONS-SCNC: 7 MMOL/L (ref 3–16)
AST SERPL-CCNC: 30 U/L (ref 15–37)
BASOPHILS ABSOLUTE: 0.1 K/UL (ref 0–0.2)
BASOPHILS RELATIVE PERCENT: 0.7 %
BILIRUB SERPL-MCNC: 0.5 MG/DL (ref 0–1)
BILIRUBIN URINE: NEGATIVE
BLOOD, URINE: NEGATIVE
BUN BLDV-MCNC: 10 MG/DL (ref 7–20)
CALCIUM SERPL-MCNC: 10.1 MG/DL (ref 8.3–10.6)
CHLORIDE BLD-SCNC: 103 MMOL/L (ref 99–110)
CLARITY: ABNORMAL
CO2: 28 MMOL/L (ref 21–32)
COLOR: YELLOW
CREAT SERPL-MCNC: 0.7 MG/DL (ref 0.9–1.3)
EOSINOPHILS ABSOLUTE: 0 K/UL (ref 0–0.6)
EOSINOPHILS RELATIVE PERCENT: 0.4 %
EPITHELIAL CELLS, UA: 0 /HPF (ref 0–5)
GFR AFRICAN AMERICAN: >60
GFR NON-AFRICAN AMERICAN: >60
GLUCOSE BLD-MCNC: 99 MG/DL (ref 70–99)
GLUCOSE URINE: NEGATIVE MG/DL
HCT VFR BLD CALC: 50.4 % (ref 40.5–52.5)
HEMOGLOBIN: 16.7 G/DL (ref 13.5–17.5)
HYALINE CASTS: 2 /LPF (ref 0–8)
KETONES, URINE: NEGATIVE MG/DL
LEUKOCYTE ESTERASE, URINE: NEGATIVE
LIPASE: 24 U/L (ref 13–60)
LYMPHOCYTES ABSOLUTE: 2.3 K/UL (ref 1–5.1)
LYMPHOCYTES RELATIVE PERCENT: 25.7 %
MCH RBC QN AUTO: 30 PG (ref 26–34)
MCHC RBC AUTO-ENTMCNC: 33.1 G/DL (ref 31–36)
MCV RBC AUTO: 90.5 FL (ref 80–100)
MICROSCOPIC EXAMINATION: YES
MONOCYTES ABSOLUTE: 0.7 K/UL (ref 0–1.3)
MONOCYTES RELATIVE PERCENT: 8.5 %
NEUTROPHILS ABSOLUTE: 5.7 K/UL (ref 1.7–7.7)
NEUTROPHILS RELATIVE PERCENT: 64.7 %
NITRITE, URINE: NEGATIVE
PDW BLD-RTO: 12.8 % (ref 12.4–15.4)
PH UA: 6.5 (ref 5–8)
PLATELET # BLD: 264 K/UL (ref 135–450)
PMV BLD AUTO: 9.4 FL (ref 5–10.5)
POTASSIUM REFLEX MAGNESIUM: 4.1 MMOL/L (ref 3.5–5.1)
PROTEIN UA: NEGATIVE MG/DL
RBC # BLD: 5.57 M/UL (ref 4.2–5.9)
RBC UA: 5 /HPF (ref 0–4)
SODIUM BLD-SCNC: 138 MMOL/L (ref 136–145)
SPECIFIC GRAVITY UA: 1.02 (ref 1–1.03)
TOTAL PROTEIN: 7.4 G/DL (ref 6.4–8.2)
URINE REFLEX TO CULTURE: ABNORMAL
URINE TYPE: ABNORMAL
UROBILINOGEN, URINE: 0.2 E.U./DL
WBC # BLD: 8.9 K/UL (ref 4–11)
WBC UA: 1 /HPF (ref 0–5)

## 2021-11-02 PROCEDURE — 99282 EMERGENCY DEPT VISIT SF MDM: CPT

## 2021-11-02 PROCEDURE — 83690 ASSAY OF LIPASE: CPT

## 2021-11-02 PROCEDURE — 80053 COMPREHEN METABOLIC PANEL: CPT

## 2021-11-02 PROCEDURE — 81001 URINALYSIS AUTO W/SCOPE: CPT

## 2021-11-02 PROCEDURE — 85025 COMPLETE CBC W/AUTO DIFF WBC: CPT

## 2021-11-02 NOTE — Clinical Note
Margaret Santos was seen and treated in our emergency department on 11/2/2021. He may return to work on 11/04/2021. If you have any questions or concerns, please don't hesitate to call.       Arnie Hicks PA-C

## 2021-11-04 ENCOUNTER — PROCEDURE VISIT (OUTPATIENT)
Dept: CARDIOLOGY CLINIC | Age: 36
End: 2021-11-04

## 2021-11-04 ENCOUNTER — OFFICE VISIT (OUTPATIENT)
Dept: CARDIOLOGY CLINIC | Age: 36
End: 2021-11-04

## 2021-11-04 VITALS
BODY MASS INDEX: 27 KG/M2 | SYSTOLIC BLOOD PRESSURE: 108 MMHG | OXYGEN SATURATION: 99 % | WEIGHT: 172 LBS | DIASTOLIC BLOOD PRESSURE: 76 MMHG | HEIGHT: 67 IN | HEART RATE: 98 BPM

## 2021-11-04 DIAGNOSIS — R00.2 PALPITATIONS: ICD-10-CM

## 2021-11-04 DIAGNOSIS — I49.3 PVC (PREMATURE VENTRICULAR CONTRACTION): ICD-10-CM

## 2021-11-04 DIAGNOSIS — I49.3 PVC'S (PREMATURE VENTRICULAR CONTRACTIONS): ICD-10-CM

## 2021-11-04 DIAGNOSIS — R07.9 CHEST PAIN, UNSPECIFIED TYPE: ICD-10-CM

## 2021-11-04 DIAGNOSIS — R07.89 CHEST TIGHTNESS: Primary | ICD-10-CM

## 2021-11-04 DIAGNOSIS — I10 PRIMARY HYPERTENSION: ICD-10-CM

## 2021-11-04 DIAGNOSIS — I10 HYPERTENSION, UNSPECIFIED TYPE: ICD-10-CM

## 2021-11-04 LAB
LV EF: 55 %
LVEF MODALITY: NORMAL

## 2021-11-04 PROCEDURE — 99214 OFFICE O/P EST MOD 30 MIN: CPT | Performed by: INTERNAL MEDICINE

## 2021-11-04 PROCEDURE — 93306 TTE W/DOPPLER COMPLETE: CPT | Performed by: INTERNAL MEDICINE

## 2021-11-04 PROCEDURE — 93015 CV STRESS TEST SUPVJ I&R: CPT | Performed by: INTERNAL MEDICINE

## 2021-11-04 RX ORDER — METOPROLOL SUCCINATE 25 MG/1
25 TABLET, EXTENDED RELEASE ORAL DAILY
Qty: 90 TABLET | Refills: 4 | Status: SHIPPED | OUTPATIENT
Start: 2021-11-04

## 2021-11-04 NOTE — PROGRESS NOTES
[facility]  Cardiac Consult     Referring Provider:  Eliz Heart MD     Chief Complaint   Patient presents with    Other     echo        History of Present Illness:  History obtained with the assistance of interpretor by phone.    29 y.o. male seen in f/u for palpitations and chest discomfort. He had a holter that showed 9% PVC's. No complex ectopy. ECHO today normal. GXT normal as well. Rare to occasional PVC mainly in recovery and not during exercise. Past Medical History:   has a past medical history of Hyperglycemia and Hypertension. Surgical History:   has no past surgical history on file. Social History:  Social History     Tobacco Use    Smoking status: Never Smoker    Smokeless tobacco: Never Used   Substance Use Topics    Alcohol use: Never        Family History:  Negative for premature CAD    Allergies:  Patient has no known allergies. Home Medications:  Prior to Visit Medications    Medication Sig Taking? Authorizing Provider   busPIRone (BUSPAR) 5 MG tablet Take 1 tablet by mouth 2 times daily Yes Eliz Heart MD   NONFORMULARY Nerve medication Yes Historical Provider, MD       [x] Medications and dosages reviewed.     ROS:  [x]Full ROS obtained and negative except as mentioned in HPI      Physical Examination:    Vitals:    11/04/21 1004   BP: 108/76   Site: Left Upper Arm   Position: Sitting   Cuff Size: Medium Adult   Pulse: 98   SpO2: 99%   Weight: 172 lb (78 kg)   Height: 5' 7\" (1.702 m)        · GENERAL: Well developed, well nourished, No acute distress  · NEUROLOGICAL: Alert and oriented  · PSYCH: Slightly anxious affect  · SKIN: Warm and dry, No visible rash,   · EYES: Pupils equal and round, Sclera non-icteric,   · HENT:  External ears and nose unremarkable, mucus membranes moist  · MUSCULOSKELETAL: Normal cephalic, neck supple  · CAROTID: Normal upstroke, no bruits  · CARDIAC: JVP normal, Normal PMI, regular rate and rhythm, normal S1S2, no murmur, rub, or gallop  · RESPIRATORY: Normal respiratory effort, clear to auscultation bilaterally  · EXTREMITIES: No LE edema  · GASTROINTESTINAL: normal bowel sounds, soft, non-tender, No hepatomegaly     CXR 10/2/2021  Questionable mild atelectasis or less likely early infiltrates scattered along the lung bases. Recommend short-term follow-up. 10/12/2021  Prominence of the pulmonary vasculature in the setting of low lung volumes. EKG: Tracings reviewed  10/2-NSR, Frequent PVCs  10/12-NSR, PVC's    ECHO today (11/4/2021)   Summary   Normal left ventricle size, wall thickness and systolic function with an   estimated ejection fraction of 55%. No regional wall motion abnormalities   are seen. E/e\"= 7. Diastolic filling parameters suggests normal diastolic function. Trivial tricuspid regurgitation. RVSP 13mmHg. IVC size is normal (<2.1cm) and collapses > 50% with respiration consistent   with normal RA pressure (3mmHg). GXT:  Summary   Normal treadmill exercise test.   Occasional premature ventricular contractions. 9.5 minutes on Harish  /85=>149/77    Assessment:     Palpitations:  PVC's. 9% on Holter.  Not with exercise today  Try Toprol 25 mg q day    Chest tightness:  GXT normal as above  Likely anxiety    HTN:  Normal today  Follow     Plan:  Toprol 25  F/u 2 months    Thank you for allowing me to participate in the care of this individual.      Stiven Kemp M.D., [facility]

## 2021-11-08 ASSESSMENT — ENCOUNTER SYMPTOMS
CONSTIPATION: 0
RHINORRHEA: 0
NAUSEA: 0
DIARRHEA: 0
EYE PAIN: 0
SHORTNESS OF BREATH: 0
BACK PAIN: 0
SORE THROAT: 0
COUGH: 0
VOMITING: 0
ABDOMINAL PAIN: 0

## 2021-11-08 NOTE — ED PROVIDER NOTES
[facility]        Pt Name: Funmi Bateman  MRN: [id number]  [place] 1985  Date of evaluation: 11/2/2021  Provider: Natanael Saba PA-C  PCP: Wilian Gastelum MD  Note Started: 9:55 AM EST      CHELSEY. I have evaluated this patient. My supervising physician was available for consultation. Triage CHIEF COMPLAINT       Chief Complaint   Patient presents with    Abnormal Lab     doctor told patient to come to ER for abnormal labs          HISTORY OF PRESENT ILLNESS   (Location/Symptom, Timing/Onset, Context/Setting, Quality, Duration, Modifying Factors, Severity)  Note limiting factors. Chief Complaint: Abnormal labs    Funmi Bateman is a 29 y.o. male who presents to the emergency department, the patient does speak [place],  was used, the patient was told that he has had elevated liver enzymes for the last 2 weeks, and was told to come to the hospital to be evaluated. His physician did write for an outpatient ultrasound, and he is wondering if we can order that here in the emergency department and do it here. He denies any abdominal pain, denies any fevers or chills, he denies any symptoms other than his physician told him to come here for abnormal labs. Nursing Notes were all reviewed and agreed with or any disagreements were addressed in the HPI. REVIEW OF SYSTEMS    (2-9 systems for level 4, 10 or more for level 5)     Review of Systems   Constitutional: Negative for chills, diaphoresis and fever. HENT: Negative for congestion, ear pain, rhinorrhea and sore throat. Eyes: Negative for pain and visual disturbance. Respiratory: Negative for cough and shortness of breath. Cardiovascular: Negative for chest pain, palpitations and leg swelling. Gastrointestinal: Negative for abdominal pain, constipation, diarrhea, nausea and vomiting.    Genitourinary: Negative for decreased urine volume, dysuria, frequency and urgency. Musculoskeletal: Negative for back pain and neck pain. Skin: Negative for rash and wound. Neurological: Negative for dizziness and light-headedness. PAST MEDICAL HISTORY     Past Medical History:   Diagnosis Date    Hyperglycemia 10/26/2021    Hypertension        SURGICAL HISTORY   No past surgical history on file. [id number]       Discharge Medication List as of 11/2/2021 12:46 PM      CONTINUE these medications which have NOT CHANGED    Details   aspirin 81 MG EC tablet Take 81 mg by mouth as needed for PainHistorical Med      busPIRone (BUSPAR) 5 MG tablet Take 1 tablet by mouth 2 times daily, Disp-60 tablet, R-0Normal      ibuprofen (ADVIL;MOTRIN) 200 MG tablet Take 200 mg by mouth every 6 hours as needed for PainHistorical Med      NONFORMULARY Nerve medicationHistorical Med             ALLERGIES     Patient has no known allergies. FAMILYHISTORY     No family history on file. SOCIAL HISTORY       Social History     Socioeconomic History    Marital status:      Spouse name: Not on file    Number of children: Not on file    Years of education: Not on file    Highest education level: Not on file   Occupational History    Not on file   Tobacco Use    Smoking status: Never Smoker    Smokeless tobacco: Never Used   Vaping Use    Vaping Use: Never used   Substance and Sexual Activity    Alcohol use: Never    Drug use: Not Currently    Sexual activity: Never   Other Topics Concern    Not on file   Social History Narrative    Not on file     Social Determinants of Health     Financial Resource Strain: Low Risk     Difficulty of Paying Living Expenses: Not hard at all   Food Insecurity: No Food Insecurity    Worried About Running Out of Food in the Last Year: Never true    [address] in the Last Year: Never true   Transportation Needs:     Lack of Transportation (Medical): Not on file    Lack of Transportation (Non-Medical):  Not on file Physical Activity:     Days of Exercise per Week: Not on file    Minutes of Exercise per Session: Not on file   Stress:     Feeling of Stress : Not on file   Social Connections:     Frequency of Communication with Friends and Family: Not on file    Frequency of Social Gatherings with Friends and Family: Not on file    Attends [employer] Services: Not on file    Active Member of [address] [place] [address] [employer] or Organizations: Not on file    Attends Club or Organization Meetings: Not on file    Marital Status: Not on file   Intimate Partner Violence:     Fear of Current or Ex-Partner: Not on file    Emotionally Abused: Not on file    Physically Abused: Not on file    Sexually Abused: Not on file   Housing Stability:     Unable to Pay for Housing in the Last Year: Not on file    Number of [place] in the Last Year: Not on file    Unstable Housing in the Last Year: Not on file       SCREENINGS             PHYSICAL EXAM    (up to 7 for level 4, 8 or more for level 5)     ED Triage Vitals [11/02/21 1034]   BP Temp Temp Source Pulse Resp SpO2 Height Weight   (!) 128/94 96.4 °F (35.8 °C) Temporal 91 14 98 % -- 172 lb (78 kg)       Physical Exam  Vitals and nursing note reviewed. Constitutional:       Appearance: Normal appearance. He is well-developed. He is not ill-appearing or diaphoretic. HENT:      Head: Normocephalic and atraumatic. Right Ear: External ear normal.      Left Ear: External ear normal.      Nose: Nose normal.   Eyes:      General:         Right eye: No discharge. Left eye: No discharge. Pulmonary:      Effort: Pulmonary effort is normal. No respiratory distress. Breath sounds: No stridor. Musculoskeletal:         General: Normal range of motion. Cervical back: Normal range of motion and neck supple. Skin:     General: Skin is warm and dry. Coloration: Skin is not pale. Neurological:      General: No focal deficit present.       Mental Status: He is alert and oriented to person, place, and time. Psychiatric:         Mood and Affect: Mood normal.         Behavior: Behavior normal.         DIAGNOSTIC RESULTS   LABS:    Labs Reviewed   COMPREHENSIVE METABOLIC PANEL W/ REFLEX TO MG FOR LOW K - Abnormal; Notable for the following components:       Result Value    CREATININE 0.7 (*)     ALT 67 (*)     All other components within normal limits    Narrative:     Performed at:  [facility]  [place]   Phone [phone]   URINE RT REFLEX TO CULTURE - Abnormal; Notable for the following components:    Clarity, UA CLOUDY (*)     All other components within normal limits    Narrative:     Performed at:  [facility]  [place]   Phone [phone]   MICROSCOPIC URINALYSIS - Abnormal; Notable for the following components:    RBC, UA 5 (*)     All other components within normal limits    Narrative:     Performed at:  [facility]  [place]   Phone [phone]   CBC WITH AUTO DIFFERENTIAL    Narrative:     Performed at:  [facility]  [place]   Phone [phone]   LIPASE    Narrative:     Performed at:  [facility]  [place]   Phone [phone]       When ordered, only abnormal lab results are displayed. All other labs were within normal range or not returned as of this dictation. EKG: When ordered, EKG's are interpreted by the Emergency Department Physician in the absence of a cardiologist.  Please see their note for interpretation of EKG.       RADIOLOGY:   Non-plain film images such as CT, Ultrasound and MRI are read by the radiologist. Plain radiographic images are visualized andpreliminarily interpreted by the  ED Provider with the below findings:        Interpretation perthe Radiologist below, if available at the time of this note:    No orders to display     No results found. PROCEDURES   Unless otherwise noted below, none     Procedures    CRITICAL CARE TIME   N/A    CONSULTS:  None      EMERGENCY DEPARTMENT COURSE and DIFFERENTIAL DIAGNOSIS/MDM:   Vitals:    Vitals:    11/02/21 1034   BP: (!) 128/94   Pulse: 91   Resp: 14   Temp: 96.4 °F (35.8 °C)   TempSrc: Temporal   SpO2: 98%   Weight: 172 lb (78 kg)       Patient was given thefollowing medications:  Medications - No data to display        This patient has a history of hepatitis, his laboratory work-up is unremarkable, with his ALT being slightly elevated. I have encouraged the patient to follow-up as an outpatient with his liver ultrasound as ordered by his primary care physician. The patient is not jaundiced, there is no evidence of an acute obstruction or an acute hepatitis crisis at this time. FINAL IMPRESSION      1.  Hepatitis          DISPOSITION/PLAN   DISPOSITION Decision To Discharge 11/02/2021 12:41:26 PM      PATIENT REFERREDTO:  Brianda Farnsworth MD  [address] [place] [address]  [phone]    Call in 2 days  For a recheck in 2-7 days      DISCHARGE MEDICATIONS:  Discharge Medication List as of 11/2/2021 12:46 PM          DISCONTINUED MEDICATIONS:  Discharge Medication List as of 11/2/2021 12:46 PM                 (Please note that portions ofthis note were completed with a voice recognition program.  Efforts were made to edit the dictations but occasionally words are mis-transcribed.)    José Manuel Corrales PA-C (electronically signed)             José Manuel Corrales PA-C  11/08/21 1007

## 2021-11-09 ENCOUNTER — OFFICE VISIT (OUTPATIENT)
Dept: INTERNAL MEDICINE CLINIC | Age: 36
End: 2021-11-09

## 2021-11-09 VITALS
DIASTOLIC BLOOD PRESSURE: 80 MMHG | BODY MASS INDEX: 29.16 KG/M2 | WEIGHT: 185.8 LBS | OXYGEN SATURATION: 98 % | HEART RATE: 63 BPM | SYSTOLIC BLOOD PRESSURE: 118 MMHG | HEIGHT: 67 IN

## 2021-11-09 DIAGNOSIS — F41.9 ANXIETY: ICD-10-CM

## 2021-11-09 DIAGNOSIS — I10 PRIMARY HYPERTENSION: ICD-10-CM

## 2021-11-09 DIAGNOSIS — K75.9 HEPATITIS: Primary | ICD-10-CM

## 2021-11-09 PROBLEM — R07.89 CHEST TIGHTNESS: Status: RESOLVED | Noted: 2021-11-04 | Resolved: 2021-11-09

## 2021-11-09 PROBLEM — R73.9 HYPERGLYCEMIA: Status: RESOLVED | Noted: 2021-10-26 | Resolved: 2021-11-09

## 2021-11-09 PROCEDURE — 99214 OFFICE O/P EST MOD 30 MIN: CPT | Performed by: INTERNAL MEDICINE

## 2021-11-09 RX ORDER — BUSPIRONE HYDROCHLORIDE 5 MG/1
5 TABLET ORAL 2 TIMES DAILY PRN
Qty: 60 TABLET | Refills: 1 | Status: SHIPPED | OUTPATIENT
Start: 2021-11-09 | End: 2021-12-09

## 2021-11-09 ASSESSMENT — ENCOUNTER SYMPTOMS: BLURRED VISION: 0

## 2021-11-09 NOTE — ASSESSMENT & PLAN NOTE
Patient was started on beta-blocker by cardiology his blood pressure is very well controlled he is also cut back on the caffeine and at this point we will continue the same treatment

## 2021-11-09 NOTE — PROGRESS NOTES
far    Hypertension  This is a chronic problem. The current episode started more than 1 year ago. The problem is unchanged. The problem is controlled. Associated symptoms include anxiety. Pertinent negatives include no blurred vision, chest pain, neck pain, palpitations or peripheral edema. Review of Systems   Eyes: Negative for blurred vision. Cardiovascular: Negative for chest pain and palpitations. Musculoskeletal: Negative for neck pain. Objective   Physical Exam  Constitutional:       General: He is not in acute distress. Appearance: Normal appearance. HENT:      Head: Normocephalic and atraumatic. Right Ear: Tympanic membrane normal.      Left Ear: Tympanic membrane normal.      Nose: Nose normal.   Eyes:      Extraocular Movements: Extraocular movements intact. Conjunctiva/sclera: Conjunctivae normal.      Pupils: Pupils are equal, round, and reactive to light. Neck:      Vascular: No carotid bruit. Cardiovascular:      Rate and Rhythm: Normal rate and regular rhythm. Pulses: Normal pulses. Heart sounds: No murmur heard. Pulmonary:      Effort: Pulmonary effort is normal. No respiratory distress. Breath sounds: Normal breath sounds. Abdominal:      General: Abdomen is flat. Bowel sounds are normal. There is no distension. Palpations: Abdomen is soft. Tenderness: There is no abdominal tenderness. Musculoskeletal:         General: No swelling, tenderness or deformity. Cervical back: Normal range of motion and neck supple. No rigidity or tenderness. Right lower leg: No edema. Left lower leg: No edema. Lymphadenopathy:      Cervical: No cervical adenopathy. Skin:     Coloration: Skin is not jaundiced. Findings: No bruising, erythema or lesion. Neurological:      General: No focal deficit present. Mental Status: He is alert and oriented to person, place, and time. Cranial Nerves: No cranial nerve deficit.

## 2021-11-09 NOTE — ASSESSMENT & PLAN NOTE
Patient's hepatitis profile has been unremarkable his liver enzymes improved some I did advise patient do the ultrasound of the liver but will check his liver function prior to his next visit as well

## 2022-01-20 PROBLEM — R07.9 CHEST PAIN: Status: ACTIVE | Noted: 2022-01-20

## 2022-02-10 ENCOUNTER — OFFICE VISIT (OUTPATIENT)
Dept: INTERNAL MEDICINE CLINIC | Age: 37
End: 2022-02-10

## 2022-02-10 VITALS
HEART RATE: 72 BPM | WEIGHT: 183 LBS | DIASTOLIC BLOOD PRESSURE: 70 MMHG | BODY MASS INDEX: 28.66 KG/M2 | SYSTOLIC BLOOD PRESSURE: 104 MMHG

## 2022-02-10 DIAGNOSIS — I10 PRIMARY HYPERTENSION: ICD-10-CM

## 2022-02-10 DIAGNOSIS — M54.12 CERVICAL RADICULITIS: Primary | ICD-10-CM

## 2022-02-10 PROBLEM — R07.9 CHEST PAIN: Status: RESOLVED | Noted: 2022-01-20 | Resolved: 2022-02-10

## 2022-02-10 PROCEDURE — 99213 OFFICE O/P EST LOW 20 MIN: CPT | Performed by: INTERNAL MEDICINE

## 2022-02-10 RX ORDER — PREDNISONE 20 MG/1
20 TABLET ORAL 2 TIMES DAILY
Qty: 10 TABLET | Refills: 0 | Status: SHIPPED | OUTPATIENT
Start: 2022-02-10 | End: 2022-02-15

## 2022-02-10 RX ORDER — METHOCARBAMOL 500 MG/1
500 TABLET, FILM COATED ORAL 4 TIMES DAILY
Qty: 40 TABLET | Refills: 0 | Status: SHIPPED | OUTPATIENT
Start: 2022-02-10 | End: 2022-02-20

## 2022-02-10 NOTE — ASSESSMENT & PLAN NOTE
Patient blood pressures well controlled we will continue the same medications and refill when needed

## 2022-02-10 NOTE — ASSESSMENT & PLAN NOTE
Patient symptoms are assistive for cervical radicular pain for shoulder discomfort at this point and through the  explained to patient that he will need to use the prednisone and muscle relaxant as well as heat therapy for the next few days if it does not improve then will get have to consider imaging and physical therapy and he will let me know

## 2022-02-10 NOTE — PATIENT INSTRUCTIONS
Patient Education        Dolor de espalda: Instrucciones de cuidado  Back Pain: Care Instructions  Instrucciones de cuidado    El dolor de espalda tiene muchas causas posibles. Con frecuencia, está relacionado con problemas en los músculos y ligamentos de la espalda. También podría estar relacionado con problemas de los nervios, discos o huesos de la espalda. Moverse, levantar algo, ponerse de pie, sentarse o dormir de [employer] incómoda pueden forzar la espalda. Algunas veces no se da cuenta de que tiene felicitas lesión [employer] tarde. La artritis es otra causa común del dolor de espalda. Aunque chapincito vez duela mucho, el dolor de espalda por lo general mejora por sí mismo en varias semanas. [address] personas se recuperan en 12 semanas o menos. Hacer un buen tratamiento en el hogar y tener cuidado de no forzar la espalda puede ayudar a que se sienta mejor más pronto. La atención de seguimiento es felicitas parte clave de castellanos tratamiento y seguridad. Asegúrese de hacer y acudir a todas las citas, y llame a castellanos médico si está teniendo problemas. También es felicitas buena idea saber los resultados de eugenie exámenes y mantener felicitas lista de los medicamentos que eligio. ¿Cómo puede cuidarse en el hogar? · Siéntese o acuéstese en las posiciones más cómodas y que reduzcan el dolor. Trate felicitas de estas posiciones al [place]:  ? Acuéstese boca arriba con las rodillas dobladas y apoyadas sobre [address]. ? Acuéstese en el piso con las piernas sobre el asiento de un sofá o felicitas silla. ? Acuéstese de lado con las rodillas y caderas dobladas y [place] almohada entre las piernas. ? Acuéstese boca abajo siempre que esta posición no empeore el dolor. · No se siente en la cama y evite los sofás blandos y las posiciones torcidas. El reposo en cama puede aliviar el dolor al principio, luz retrasa la sanación. Evite reposar en la cama después del primer día de dolor de espalda. · Cambie de posición cada 30 minutos.  Si debe sentarse por mucho tiempo, párese y descanse de estar sentado. Levántese y camine, o acuéstese en felicitas posición cómoda. · Pruebe usar felicitas almohadilla térmica a temperatura baja o mediana emery 15 a 20 minutos cada 2 ó 3 horas. Pruebe felicitas ducha tibia en vez de felicitas sesión con la almohadilla térmica. · También puede probar felicitas compresa de hielo emery 10 a 15 minutos cada 2 a 3 horas. Póngase un paño ochoa entre la compresa de hielo y la piel. · [employer] analgésicos (medicamentos para el dolor) exactamente según las indicaciones. ? Si el médico le recetó un analgésico, tómelo según las indicaciones. ? Si no está tomando un analgésico recetado, pregúntele a castellanos médico si puede sam niecy de [employer]. · Zoë caminatas cortas varias veces al día. Usted puede comenzar con 5 a 10 minutos, 3 ó 4 veces al día, y aumentar progresivamente hasta lograr caminatas más largas. Camine en superficies serene y evite colinas y escaleras hasta que la espalda esté mejor. · Regrese al Dicie Canny y otras actividades lo más pronto posible. El descanso continuo sin actividad por lo general no es banda para la espalda. · Para prevenir el dolor de espalda en el futuro, zoë ejercicios para estirar y fortalecer la espalda y el abdomen. Aprenda a [place], técnicas seguras para levantar peso y la mecánica corporal apropiada. ¿Cuándo debe pedir ayuda? Llame a castellanos médico ahora mismo o busque atención médica inmediata si:    · Tiene un entumecimiento nuevo o peor en las piernas.     · Tiene debilidad nueva o peor en las piernas. ([place] puede hacer que le resulte difícil ponerse de pie).   · Pierde el control de la vejiga o los intestinos. Preste especial atención a los cambios en castellanos homer y asegúrese de comunicarse con castellanos médico si:    · Tiene fiebre, pierde peso o no se siente vaishali.     · No mejora landy se esperaba. ¿Dónde puede encontrar más información en inglés? James Honeycutt a [email or web address] a castellanos cuenta de MyChart. Peña Reddy [address] en el Euna Seen \"Search Health Information\" para más información (en inglés) sobre \"Dolor de espalda: Instrucciones de cuidado. \"     Si no tiene felicitas cuenta, zoë clic en el enlace \"Sign Up Now\". Revisado: 1 julio, 2021               Versión del contenido: 13.1  © 0982-0814 Healthwise, Incorporated. Las instrucciones de cuidado fueron adaptadas bajo licencia por [facility] CARE ([facility]). Si usted tiene [place] afección médica o sobre estas instrucciones, siempre pregunte a castellanos profesional de homer. Healthwise, Incorporated niega toda garantía o responsabilidad por castellanos uso de esta información. Patient Education        Jennifer Carry del dolor de espalda  Learning About Relief for Back Pain  ¿Qué son la tensión y la distensión en la espalda? La distensión en la espalda ocurre cuando usted estira [place], o fuerza, un músculo de la espalda. Usted puede lesionarse la espalda en un accidente o cuando hace ejercicio o levanta algo. La mayoría de los danielle de espalda mejoran con reposo y con el tiempo. Usted puede cuidarse en el hogar para ayudar a que castellanos espalda sane. ¿Qué es lo martine que puede hacer para aliviar el dolor de espalda? Cuando empiece a sentir dolor de espalda, siga estos pasos:  · Camine. Dé un paseo corto (10 a 20 minutos) sobre felicitas superficie plana (sin pendientes, donde no haya colinas o escaleras) cada 2 o 3 horas. Solo camine distancias que pueda manejar sin dolor, especialmente dolor en las piernas. · Relájese. Encuentre felicitas posición cómoda para descansar. Algunas personas se sienten cómodas en el suelo o en felicitas cama de firmeza media con felicitas almohada pequeña debajo de la siri y otra debajo de las rodillas. Otras prefieren acostarse de lado con felicitas almohada entre las rodillas. No permanezca en felicitas posición por [employer]. · Pruebe con calor o hielo.  Trate de [employer] almohadilla térmica a baja o media temperatura, o tome felicitas ducha tibia de 15 o 20 minutos cada 2 o 3 horas. O puede comprar vendas térmicas desechables que se usan felicitas fide vez por hasta 8 horas. También puede probar compresas de hielo entre 10 y 15 minutos cada 2 o 3 horas. Puede usar felicitas compresa de hielo o felicitas bolsa de verduras congeladas envuelta en felicitas toalla delgada. No existe evidencia sólida de que el calor o el hielo ayuden, luz puede probarlos para iron si son de [facility]. También podría convenirle probar alternar [employer] frío y el calor. · [employer] analgésicos (medicamentos para el dolor) exactamente según las indicaciones. ? Si el médico le recetó un analgésico, tómelo según las indicaciones. ? Si no está tomando un analgésico recetado, pregúntele a castellanos médico si puede sam niecy de [employer]. ¿Qué más puede hacer? · Elmon Galas estiramientos y ejercicio. Los ejercicios que incrementan la flexibilidad pueden aliviar el dolor y facilitar que los músculos mantengan a la columna vertebral en felicitas buena posición neutra. Y no se olvide de seguir caminando. · Hágase masajes usted mismo. Usted puede darse masaje para relajarse después del trabajo o de la escuela o para sentirse lleno de energía en la mañana. No es difícil [employer], las cassia o el epi. El darse masaje usted mismo funciona mejor si está con ropa cómoda y sentado o [place] en felicitas posición cómoda. Utilice aceite o loción para masajearse la piel directamente. · Reduzca el estrés. El dolor de espalda puede llevar a un círculo jovanny: La angustia por el dolor tensa los músculos en la espalda, lo que a castellanos vez causa más dolor. Aprenda a relajar la mente y los músculos para disminuir el estrés. ¿Dónde puede encontrar más información en inglés? Tamika Levers a [email or web address] e ingrese a castellanos cuenta de MyChart. Andre Gonzalez W620 en el Manjit Knee \"Search Health Information\" para más información (en inglés) sobre \"Aprenda sobre el [place] del dolor de espalda. \"     Si no tiene felicitas Ferol Dom ruth en el enlace \"Sign Up Now\". Revisado: 1 julio, 2021               Versión del contenido: 13.1  © 1915-6152 Healthwise, Incorporated. Las instrucciones de cuidado fueron adaptadas bajo licencia por [facility] ([facility]). Si usted tiene [place] afección médica o sobre estas instrucciones, siempre pregunte a castellanos profesional de homer. Healthwise, Incorporated niega toda garantía o responsabilidad por castellanos uso de esta información. Patient Education        Leata Umang y relaciones sexuales: Instrucciones de cuidado  Back Pain and Sex: Care Instructions  Instrucciones de cuidado     Es común tener miedo a las relaciones sexuales cuando se tiene dolor de [place]. Raquel eso no tiene por qué impedirle que tenga felicitas reggie sexual satisfactoria. Hable con castellanos tiff acerca de los movimientos que son cómodos para usted y los que no. Y si la idea de [employer] acto sexual le aterra, hable sobre eso [place]. La atención de seguimiento es felicitas parte clave de castellanos tratamiento y seguridad. Asegúrese de hacer y acudir a todas las citas, y llame a castellanos médico si está teniendo problemas. También es felicitas buena idea saber los resultados de eugenie exámenes y mantener felicitas lista de los medicamentos que eligio. ¿Cómo puede cuidarse en el hogar? · Infórmese acerca de las posiciones sexuales que pueden ser buenas o malas para la espalda. Por ejemplo, algunos problemas de espalda causan dolor cuando se flexiona hacia adelante. Otros causan problemas cuando se arquea la espalda. · Intente posiciones que nunca haya considerado antes. Elmyra  necesite [employer] superficie más firme que castellanos colchón landy, por ejemplo, felicitas alfombra suave en el suelo o incluso felicitas silla firme. El sexo oral puede ser más fácil que el coito. · Vaya despacio. El sexo es landy el ejercicio, es importante martine hacer calentamiento y estiramiento. Muchas personas utilizan el yoga para estirar suavemente los músculos.  Cuando esté listo Belenda Leisure relaciones sexuales, que eugenie movimientos seun lentos y [place]. · [place] felicitas ducha caliente para ayudar a A.P Avanashiappa Silk. O pídale a castellanos tiff que le dé un masaje. · Aumente la cantidad de tiempo que usted y castellanos Arian Roys a tocarse y acariciarse antes de tener relaciones sexuales (estimulación erótica previa). · Si le duele, deténgase. Eso puede parecer obvio, luz cuando las cosas se ponen apasionadas, puede ser difícil mantener el control. Trate de ir lento para que pueda detenerse inmediatamente si le empieza a doler la espalda. ¿Cuándo debe pedir ayuda? Preste especial atención a los cambios en castellanos homer y asegúrese de comunicarse con castellanos médico si tiene algún problema. ¿Dónde puede encontrar más información en inglés? Zoë Angles a [email or web address] e ingrese a castellanos cuenta de MyChart. Chencho Muss T628 en el FreeDrive Batch \"Search Health Information\" para más información (en inglés) sobre \"Dolor de espalda y relaciones sexuales: Instrucciones de [address] [place] [address]. \"     Si no tiene felicitas cuenta, zoë ruth en el enlace \"Sign Up Now\". Revisado: 1 julio, 2021               Versión del contenido: 13.1  © 4146-2582 Healthwise, Incorporated. Las instrucciones de cuidado fueron adaptadas bajo licencia por [facility] ([facility]). Si usted tiene [place] afección médica o sobre estas instrucciones, siempre pregunte a castellanos profesional de homer. Healthwise, Incorporated niega toda garantía o responsabilidad por castellanos uso de esta información. Patient Education        Estiramientos de la espalda: Ejercicios  Back Stretches: Exercises  Instrucciones de cuidado  Aquí se presentan algunos ejemplos de ejercicios para estiramiento de la espalda. Empiece cada ejercicio lentamente. Reduzca la intensidad del ejercicio si Laymon Terese a tener dolor. Castellanos médico o fisioterapeuta le dirán cuándo puede comenzar con estos ejercicios y cuáles funcionarán mejor para usted.   Cómo hacer los ejercicios  [employer] siri    1. Párese cómodamente y separe los pies a la distancia de los hombros.  2. Tomy Frame adelante, levante ambos brazos sobre castellanos Donzell Pace y trate de alcanzar el techo. No deje que la siri se incline [place] atrás. 3. Mantenga la posición emery 15 a 30 segundos y [employer] a los lados. 4. Repita de 2 a 4 veces. Estiramiento lateral    1. Párese cómodamente y separe los pies a la distancia de los hombros.  2. Regenia Alexanders un brazo sobre la siri y luego inclínese hacia el otro lado. 3. Deslice castellanos mano por la pierna mientras rico que el peso de castellanos brazo estire suavemente los músculos laterales. Mantenga la posición entre 15 y 27 segundos. 4. Repita de 2 a 4 veces de cada lado. [place]    1. Acuéstese boca abajo, apoyando castellanos cuerpo con los antebrazos. 2. Beba presión con los codos en el piso para elevar la espalda. Al hacer esto, relaje los músculos del estómago y permita que castellanos espalda se arquee sin usar los músculos de castellanos espalda. Al hacer presión, evite que eugenie caderas o la pelvis se separen del piso. 3. Mantenga la posición emery 15 a 30 segundos y luego relájese. 4. Repita de 2 a 4 veces. Relajamiento y descanso    1. Acuéstese boca arriba con felicitas toalla enrollada debajo de castellanos epi y Alexandra almohada debajo de las rodillas. Extienda los brazos cómodamente a los lados. 2. Relájese y respire con normalidad. 3. Permanezca en esta posición emery unos 10 minutos. 4. Si quiere, puede hacer [employer] 2 y 3 veces al día. La atención de seguimiento es felicitas parte clave de castellanos tratamiento y seguridad. Asegúrese de hacer y acudir a todas las citas, y llame a castellanos médico si está teniendo problemas. También es felicitas buena idea saber los resultados de eugenie exámenes y mantener felicitas lista de los medicamentos que eligio. ¿Dónde puede encontrar más información en inglés? Genice Heap a [email or web address] e ingrese a castellanos cuenta de MyChart.  Kareem Alfaro G584 en el Brayden Carter \"Search Health Information\" para más información (en inglés) sobre \"Estiramientos de la espalda: Ejercicios. \"     Si no tiene felicitas cuenta, zoë ruth en el enlace \"Sign Up Now\". Revisado: 1 julio, 2021               Versión del contenido: 13.1  © 7776-1232 Healthwise, Incorporated. Las instrucciones de cuidado fueron adaptadas bajo licencia por [facility] ([facility]). Si usted tiene [place] afección médica o sobre estas instrucciones, siempre pregunte a castellanos profesional de homer. Healthwise, Incorporated niega toda garantía o responsabilidad por castellanos uso de esta información.

## 2022-02-10 NOTE — PROGRESS NOTES
Jose Luis An (:  1985) is a 29 y.o. male,New patient, here for evaluation of the following chief complaint(s):  3 Month Follow-Up and Other (Pt reports having nerve discomfort or pain in various areas of body including head neck and left arm )         ASSESSMENT/PLAN:  1. Cervical radiculitis  Assessment & Plan:  Patient symptoms are assistive for cervical radicular pain for shoulder discomfort at this point and through the  explained to patient that he will need to use the prednisone and muscle relaxant as well as heat therapy for the next few days if it does not improve then will get have to consider imaging and physical therapy and he will let me know  Orders:  -     predniSONE (DELTASONE) 20 MG tablet; Take 1 tablet by mouth 2 times daily for 5 days, Disp-10 tablet, R-0Normal  -     methocarbamol (ROBAXIN) 500 MG tablet; Take 1 tablet by mouth 4 times daily for 10 days, Disp-40 tablet, R-0Normal  2. Primary hypertension  Assessment & Plan:  Patient blood pressures well controlled we will continue the same medications and refill when needed      Return if symptoms worsen or fail to improve.          Subjective   SUBJECTIVE/OBJECTIVE:    Lab Review   Lab Results   Component Value Date     2021     10/29/2021     10/12/2021    K 4.1 2021    K 4.5 10/29/2021    K 4.3 10/12/2021    K 3.6 10/02/2021    CO2 28 2021    CO2 25 10/29/2021    CO2 23 10/12/2021    BUN 10 2021    BUN 11 10/29/2021    BUN 15 10/12/2021    CREATININE 0.7 2021    CREATININE 0.7 10/29/2021    CREATININE 0.7 10/12/2021    GLUCOSE 99 2021    GLUCOSE 90 10/29/2021    GLUCOSE 104 10/12/2021    CALCIUM 10.1 2021    CALCIUM 10.2 10/29/2021    CALCIUM 9.7 10/12/2021     Lab Results   Component Value Date    WBC 8.9 2021    WBC 8.9 10/12/2021    WBC 10.2 10/02/2021    HGB 16.7 2021    HGB 16.2 10/12/2021    HGB 16.3 10/02/2021    HCT 50.4 2021    HCT 47.1 10/12/2021    HCT 46.1 10/02/2021    MCV 90.5 11/02/2021    MCV 89.5 10/12/2021    MCV 87.7 10/02/2021     11/02/2021     10/12/2021     10/02/2021     Lab Results   Component Value Date    CHOL 199 10/29/2021    TRIG 90 10/29/2021    HDL 53 10/29/2021       Vitals 2/10/2022 11/9/2021 47/8/9174   SYSTOLIC 830 681 286   DIASTOLIC 70 80 76   Site - - Left Upper Arm   Position - - Sitting   Cuff Size - - Medium Adult   Pulse 72 63 98   Temp - - -   Resp - - -   SpO2 - 98 99   Weight 183 lb 185 lb 12.8 oz 172 lb   Height - 5' 7\" 5' 7\"   Body mass index - 29.1 kg/m2 26.94 kg/m2   Pain Level - - -       Patient is having neck pain and shoulder pain with pain radiating down the left shoulder no weakness no other neurological symptoms    Other  This is a new problem. The current episode started more than 1 month ago. Review of Systems       Objective   Physical Exam  Constitutional:       General: He is not in acute distress. Appearance: Normal appearance. HENT:      Head: Normocephalic and atraumatic. Right Ear: Tympanic membrane normal.      Left Ear: Tympanic membrane normal.      Nose: Nose normal.   Eyes:      Extraocular Movements: Extraocular movements intact. Conjunctiva/sclera: Conjunctivae normal.      Pupils: Pupils are equal, round, and reactive to light. Neck:      Vascular: No carotid bruit. Cardiovascular:      Rate and Rhythm: Normal rate and regular rhythm. Pulses: Normal pulses. Heart sounds: No murmur heard. Pulmonary:      Effort: Pulmonary effort is normal. No respiratory distress. Breath sounds: Normal breath sounds. Abdominal:      General: Abdomen is flat. Bowel sounds are normal. There is no distension. Palpations: Abdomen is soft. Tenderness: There is no abdominal tenderness. Musculoskeletal:         General: No swelling, tenderness or deformity. Cervical back: Normal range of motion and neck supple.  No rigidity or tenderness. Right lower leg: No edema. Left lower leg: No edema. Lymphadenopathy:      Cervical: No cervical adenopathy. Skin:     Coloration: Skin is not jaundiced. Findings: No bruising, erythema or lesion. Neurological:      General: No focal deficit present. Mental Status: He is alert and oriented to person, place, and time. Cranial Nerves: No cranial nerve deficit. Motor: No weakness. Gait: Gait normal.            This dictation was generated by voice recognition computer software. Although all attempts are made to edit the dictation for accuracy, there may be errors in the transcription that are not intended. An electronic signature was used to authenticate this note.     --Nuno Perdue MD

## 2023-03-11 ENCOUNTER — HOSPITAL ENCOUNTER (EMERGENCY)
Age: 38
Discharge: HOME | End: 2023-03-11
Attending: EMERGENCY MEDICINE

## 2023-03-11 VITALS
SYSTOLIC BLOOD PRESSURE: 162 MMHG | HEART RATE: 99 BPM | DIASTOLIC BLOOD PRESSURE: 98 MMHG | RESPIRATION RATE: 18 BRPM | TEMPERATURE: 98.4 F | OXYGEN SATURATION: 99 %

## 2023-03-11 DIAGNOSIS — Z20.2 EXPOSURE TO HUMAN PAPILLOMAVIRUS: Primary | ICD-10-CM

## 2023-03-11 PROCEDURE — 87390 HIV-1 AG IA: CPT

## 2023-03-11 PROCEDURE — 99282 EMERGENCY DEPT VISIT SF MDM: CPT

## 2023-03-11 PROCEDURE — 86702 HIV-2 ANTIBODY: CPT

## 2023-03-11 PROCEDURE — 86701 HIV-1ANTIBODY: CPT

## 2023-03-11 ASSESSMENT — ENCOUNTER SYMPTOMS
SHORTNESS OF BREATH: 0
NAUSEA: 0
CONSTIPATION: 0
DIARRHEA: 0
COLOR CHANGE: 0
VOMITING: 0
BACK PAIN: 0
ABDOMINAL PAIN: 0
RESPIRATORY NEGATIVE: 1
CHEST TIGHTNESS: 0
COUGH: 0

## 2023-03-11 ASSESSMENT — LIFESTYLE VARIABLES
HOW OFTEN DO YOU HAVE A DRINK CONTAINING ALCOHOL: NEVER
HOW MANY STANDARD DRINKS CONTAINING ALCOHOL DO YOU HAVE ON A TYPICAL DAY: PATIENT DOES NOT DRINK

## 2023-03-11 NOTE — ED PROVIDER NOTES
[address] [facility]        Pt Name: Pamela Rodarte  MRN: [id number]  [place] 1985  Date of evaluation: 3/11/2023  Provider: DELLA Greenfield  PCP: Sergio Barron MD  Note Started: 4:53 PM EST 3/11/23      CHELSEY. I have evaluated this patient. My supervising physician was available for consultation. CHIEF COMPLAINT       Chief Complaint   Patient presents with    Exposure to STD     Pt here because wife was diagnosed with HPV, and wants further treatment for himself. HISTORY OF PRESENT ILLNESS: 1 or more Elements     History From: Patient  Limitations to history : Language [place]    Pamela Rodarte is a 40 y.o. male with no significant past medical history who presents to the ED with complaint of STD exposure. His wife was just recently diagnosed with a wart. States was told that she had HPV. He states he came to the ED because he was concerned that he eventually had HPV. Patient states he has never been vaccinated for HPV. He denies any genital warts or lesions. He states he does engage in anal intercourse with his wife but denies any receptive anal intercourse. He denies any concern for other/internal infection. States he is only sexually active with his wife. He denies any testicular pain or swelling. He denies any penile discharge, dysuria, frequency, urgency or hematuria. Denies rectal pain or changes in bowel movements. He denies abdominal pain, flank pain or back pain. Denies nausea or vomiting. Denies any fever or chills. Nursing Notes were all reviewed and agreed with or any disagreements were addressed in the HPI. REVIEW OF SYSTEMS :      Review of Systems   Constitutional:  Negative for activity change, appetite change, chills and fever. Respiratory: Negative. Negative for cough, chest tightness and shortness of breath. Cardiovascular: Negative.   Negative for chest pain, palpitations and leg swelling. Gastrointestinal:  Negative for abdominal pain, constipation, diarrhea, nausea and vomiting. Genitourinary:  Negative for decreased urine volume, difficulty urinating, dysuria, flank pain, frequency, genital sores, hematuria, penile discharge, penile pain, penile swelling, scrotal swelling, testicular pain and urgency. Musculoskeletal:  Negative for arthralgias, back pain, myalgias, neck pain and neck stiffness. Skin:  Negative for color change, pallor, rash and wound. Neurological:  Negative for dizziness, light-headedness and headaches. Positives and Pertinent negatives as per HPI. SURGICAL HISTORY   History reviewed. No pertinent surgical history. [address]       Discharge Medication List as of 3/11/2023  4:15 PM        CONTINUE these medications which have NOT CHANGED    Details   metoprolol succinate (TOPROL XL) 25 MG extended release tablet Take 1 tablet by mouth daily, Disp-90 tablet, R-4Normal      NONFORMULARY Nerve medicationHistorical Med             ALLERGIES     Patient has no known allergies. FAMILYHISTORY     History reviewed. No pertinent family history. SOCIAL HISTORY       Social History     Tobacco Use    Smoking status: Never    Smokeless tobacco: Never   Vaping Use    Vaping Use: Never used   Substance Use Topics    Alcohol use: Never    Drug use: Not Currently       SCREENINGS        Jose Coma Scale  Eye Opening: Spontaneous  Best Verbal Response: Oriented  Best Motor Response: Obeys commands  Jose Coma Scale Score: 15                CIWA Assessment  BP: (!) 162/98  Heart Rate: 99           PHYSICAL EXAM  1 or more Elements     ED Triage Vitals [03/11/23 1523]   BP Temp Temp src Heart Rate Resp SpO2 Height Weight   (!) 162/98 98.4 °F (36.9 °C) -- 99 18 99 % -- --       Physical Exam  Constitutional:       General: He is not in acute distress. Appearance: Normal appearance. He is well-developed.  He is not ill-appearing, toxic-appearing or diaphoretic. HENT:      Head: Normocephalic and atraumatic. Right Ear: External ear normal.      Left Ear: External ear normal.   Eyes:      General:         Right eye: No discharge. Left eye: No discharge. Cardiovascular:      Rate and Rhythm: Normal rate. Pulmonary:      Effort: Pulmonary effort is normal. No respiratory distress. Breath sounds: No stridor. Abdominal:      General: Abdomen is flat. There is no distension. Palpations: Abdomen is soft. There is no mass. Tenderness: There is no abdominal tenderness. There is no right CVA tenderness, left CVA tenderness, guarding or rebound. Hernia: No hernia is present. There is no hernia in the left inguinal area or right inguinal area. Genitourinary:     Pubic Area: No rash. Penis: Normal and circumcised. No phimosis, paraphimosis, hypospadias, erythema, tenderness, discharge, swelling or lesions. Testes: Normal. Cremasteric reflex is present. Right: Mass, tenderness, swelling, testicular hydrocele or varicocele not present. Right testis is descended. Cremasteric reflex is present. Left: Mass, tenderness, swelling, testicular hydrocele or varicocele not present. Left testis is descended. Cremasteric reflex is present. Epididymis:      Right: Normal. Not inflamed or enlarged. No mass or tenderness. Left: Normal. Not inflamed or enlarged. No mass or tenderness. Rectum: Normal. No mass, tenderness, anal fissure, external hemorrhoid or internal hemorrhoid. Musculoskeletal:         General: Normal range of motion. Cervical back: Normal range of motion and neck supple. Lymphadenopathy:      Lower Body: No right inguinal adenopathy. No left inguinal adenopathy. Skin:     General: Skin is warm and dry. Coloration: Skin is not pale. Findings: No erythema or rash.    Neurological:      Mental Status: He is alert and oriented to person, place, and time.   Psychiatric:         Behavior: Behavior normal.           DIAGNOSTIC RESULTS   LABS:    Labs Reviewed   HIV SCREEN       When ordered only abnormal lab results are displayed. All other labs were within normal range or not returned as of this dictation. EKG: When ordered, EKG's are interpreted by the Emergency Department Physician in the absence of a cardiologist.  Please see their note for interpretation of EKG. RADIOLOGY:   Non-plain film images such as CT, Ultrasound and MRI are read by the radiologist. Plain radiographic images are visualized and preliminarily interpreted by the ED Provider with the below findings:        Interpretation per the Radiologist below, if available at the time of this note:    No orders to display     No results found. No results found. PROCEDURES   Unless otherwise noted below, none     Procedures    CRITICAL CARE TIME (.cctime)       PAST MEDICAL HISTORY      has a past medical history of Anxiety (11/9/2021), Hyperglycemia (10/26/2021), and Hypertension. EMERGENCY DEPARTMENT COURSE and DIFFERENTIAL DIAGNOSIS/MDM:   Vitals:    Vitals:    03/11/23 1523   BP: (!) 162/98   Pulse: 99   Resp: 18   Temp: 98.4 °F (36.9 °C)   SpO2: 99%       Patient was given the following medications:  Medications - No data to display          Is this patient to be included in the SEP-1 Core Measure due to severe sepsis or septic shock? No   Exclusion criteria - the patient is NOT to be included for SEP-1 Core Measure due to: Infection is not suspected    Chronic Conditions affecting care:    has a past medical history of Anxiety (11/9/2021), Hyperglycemia (10/26/2021), and Hypertension. CONSULTS: (Who and What was discussed)  None      Social Determinants Significantly Affecting Health : Language Barrier    Records Reviewed (External and Source) None    CC/HPI Summary, DDx, ED Course, and Reassessment: Patient is a 13-year-old male who presents the ED with concern for HPV. States wife just recently diagnosed with genital wart and was told she had HPV. He is concerned he potentially is HPV. He has no lesions noted on exam.  He denies any history of genital warts in the past.  Offered testing for gonorrhea, chlamydia and trichomonas but he denies concern for these and declined further testing. Offered HIV screen which she was agreeable to. HIV screen is currently pending. He will need to follow-up on results with care everywhere and follow-up with PCP. Return to ED for any worsening symptoms. Lengthy discussion with patient about HPV and signs/symptoms to watch out for. I am the Primary Clinician of Record. FINAL IMPRESSION      1. Exposure to human papillomavirus          DISPOSITION/PLAN     DISPOSITION Decision To Discharge 03/11/2023 04:12:08 PM      PATIENT REFERRED TO:  Eliz Heart MD  [address]  [phone]    Schedule an appointment as soon as possible for a visit   For a Re-check in  7-10    days.     [facility] Emergency Department  [address] [facility]  [phone]  Go to   As needed, If symptoms worsen      DISCHARGE MEDICATIONS:  Discharge Medication List as of 3/11/2023  4:15 PM          DISCONTINUED MEDICATIONS:  Discharge Medication List as of 3/11/2023  4:15 PM                 (Please note that portions of this note were completed with a voice recognition program.  Efforts were made to edit the dictations but occasionally words are mis-transcribed.)    DELLA Bethea (electronically signed)       DELLA Richard  03/11/23 9730

## 2023-03-12 LAB
HIV AG/AB: NORMAL
HIV ANTIGEN: NORMAL
HIV-1 ANTIBODY: NORMAL
HIV-2 AB: NORMAL

## 2024-03-15 ENCOUNTER — TELEPHONE (OUTPATIENT)
Dept: INTERNAL MEDICINE CLINIC | Age: 39
End: 2024-03-15